# Patient Record
Sex: MALE | Race: WHITE | NOT HISPANIC OR LATINO
[De-identification: names, ages, dates, MRNs, and addresses within clinical notes are randomized per-mention and may not be internally consistent; named-entity substitution may affect disease eponyms.]

---

## 2017-05-16 ENCOUNTER — APPOINTMENT (OUTPATIENT)
Dept: INTERNAL MEDICINE | Facility: CLINIC | Age: 57
End: 2017-05-16

## 2017-05-16 VITALS
SYSTOLIC BLOOD PRESSURE: 116 MMHG | DIASTOLIC BLOOD PRESSURE: 70 MMHG | BODY MASS INDEX: 26.92 KG/M2 | WEIGHT: 188 LBS | HEART RATE: 70 BPM | HEIGHT: 70 IN | RESPIRATION RATE: 16 BRPM

## 2017-05-19 ENCOUNTER — EMERGENCY (EMERGENCY)
Facility: HOSPITAL | Age: 57
LOS: 1 days | Discharge: HOME | End: 2017-05-19
Admitting: EMERGENCY MEDICINE
Payer: COMMERCIAL

## 2017-05-19 PROCEDURE — 99283 EMERGENCY DEPT VISIT LOW MDM: CPT

## 2017-05-19 PROCEDURE — 99282 EMERGENCY DEPT VISIT SF MDM: CPT

## 2017-06-09 ENCOUNTER — MEDICATION RENEWAL (OUTPATIENT)
Age: 57
End: 2017-06-09

## 2017-07-12 ENCOUNTER — MEDICATION RENEWAL (OUTPATIENT)
Age: 57
End: 2017-07-12

## 2017-07-14 ENCOUNTER — MEDICATION RENEWAL (OUTPATIENT)
Age: 57
End: 2017-07-14

## 2017-08-10 ENCOUNTER — MEDICATION RENEWAL (OUTPATIENT)
Age: 57
End: 2017-08-10

## 2017-08-29 LAB
25(OH)D3 SERPL-MCNC: 27.4 NG/ML
ALBUMIN SERPL ELPH-MCNC: 3.5 G/DL
ALP BLD-CCNC: 80 U/L
ALT SERPL-CCNC: 8 U/L
ANION GAP SERPL CALC-SCNC: 13 MMOL/L
APPEARANCE: CLEAR
AST SERPL-CCNC: 9 U/L
BACTERIA: NEGATIVE
BASOPHILS # BLD AUTO: 0.06 K/UL
BASOPHILS NFR BLD AUTO: 0.7 %
BILIRUB SERPL-MCNC: 0.3 MG/DL
BILIRUBIN URINE: NEGATIVE
BLOOD URINE: NEGATIVE
BUN SERPL-MCNC: 21 MG/DL
CALCIUM SERPL-MCNC: 8.8 MG/DL
CHLORIDE SERPL-SCNC: 103 MMOL/L
CHOLEST SERPL-MCNC: 237 MG/DL
CHOLEST/HDLC SERPL: 5.6 RATIO
CO2 SERPL-SCNC: 23 MMOL/L
COLOR: YELLOW
CREAT SERPL-MCNC: 0.82 MG/DL
EOSINOPHIL # BLD AUTO: 0.14 K/UL
EOSINOPHIL NFR BLD AUTO: 1.7 %
GLUCOSE QUALITATIVE U: NORMAL MG/DL
GLUCOSE SERPL-MCNC: 96 MG/DL
HBA1C MFR BLD HPLC: 5.8 %
HCT VFR BLD CALC: 37.5 %
HDLC SERPL-MCNC: 42 MG/DL
HGB BLD-MCNC: 11.5 G/DL
IMM GRANULOCYTES NFR BLD AUTO: 0.2 %
KETONES URINE: NEGATIVE
LDLC SERPL CALC-MCNC: 170 MG/DL
LEUKOCYTE ESTERASE URINE: NEGATIVE
LYMPHOCYTES # BLD AUTO: 2.24 K/UL
LYMPHOCYTES NFR BLD AUTO: 27.5 %
MAN DIFF?: NORMAL
MCHC RBC-ENTMCNC: 24.7 PG
MCHC RBC-ENTMCNC: 30.7 GM/DL
MCV RBC AUTO: 80.5 FL
MICROSCOPIC-UA: NORMAL
MONOCYTES # BLD AUTO: 0.54 K/UL
MONOCYTES NFR BLD AUTO: 6.6 %
NEUTROPHILS # BLD AUTO: 5.16 K/UL
NEUTROPHILS NFR BLD AUTO: 63.3 %
NITRITE URINE: NEGATIVE
PH URINE: 5.5
PLATELET # BLD AUTO: 365 K/UL
POTASSIUM SERPL-SCNC: 3.8 MMOL/L
PROT SERPL-MCNC: 6.8 G/DL
PROTEIN URINE: NEGATIVE MG/DL
PSA SERPL-MCNC: 0.64 NG/ML
RBC # BLD: 4.66 M/UL
RBC # FLD: 14.9 %
RED BLOOD CELLS URINE: 3 /HPF
SODIUM SERPL-SCNC: 139 MMOL/L
SPECIFIC GRAVITY URINE: 1.02
SQUAMOUS EPITHELIAL CELLS: 0 /HPF
TRIGL SERPL-MCNC: 124 MG/DL
TSH SERPL-ACNC: 2.75 UIU/ML
UROBILINOGEN URINE: NORMAL MG/DL
WBC # FLD AUTO: 8.16 K/UL
WHITE BLOOD CELLS URINE: 0 /HPF

## 2017-08-31 ENCOUNTER — APPOINTMENT (OUTPATIENT)
Dept: INTERNAL MEDICINE | Facility: CLINIC | Age: 57
End: 2017-08-31
Payer: COMMERCIAL

## 2017-08-31 PROCEDURE — 99214 OFFICE O/P EST MOD 30 MIN: CPT

## 2017-09-05 ENCOUNTER — MEDICATION RENEWAL (OUTPATIENT)
Age: 57
End: 2017-09-05

## 2017-09-06 ENCOUNTER — MEDICATION RENEWAL (OUTPATIENT)
Age: 57
End: 2017-09-06

## 2017-09-26 ENCOUNTER — APPOINTMENT (OUTPATIENT)
Dept: PSYCHIATRY | Facility: CLINIC | Age: 57
End: 2017-09-26
Payer: COMMERCIAL

## 2017-09-26 PROCEDURE — 99214 OFFICE O/P EST MOD 30 MIN: CPT

## 2017-10-10 ENCOUNTER — MEDICATION RENEWAL (OUTPATIENT)
Age: 57
End: 2017-10-10

## 2017-10-31 ENCOUNTER — APPOINTMENT (OUTPATIENT)
Dept: PSYCHIATRY | Facility: CLINIC | Age: 57
End: 2017-10-31
Payer: COMMERCIAL

## 2017-10-31 PROCEDURE — 99213 OFFICE O/P EST LOW 20 MIN: CPT

## 2017-11-06 ENCOUNTER — MEDICATION RENEWAL (OUTPATIENT)
Age: 57
End: 2017-11-06

## 2017-11-29 ENCOUNTER — FORM ENCOUNTER (OUTPATIENT)
Age: 57
End: 2017-11-29

## 2017-11-30 ENCOUNTER — OUTPATIENT (OUTPATIENT)
Dept: OUTPATIENT SERVICES | Facility: HOSPITAL | Age: 57
LOS: 1 days | End: 2017-11-30
Payer: COMMERCIAL

## 2017-11-30 ENCOUNTER — APPOINTMENT (OUTPATIENT)
Dept: PSYCHIATRY | Facility: CLINIC | Age: 57
End: 2017-11-30
Payer: COMMERCIAL

## 2017-11-30 ENCOUNTER — APPOINTMENT (OUTPATIENT)
Dept: ULTRASOUND IMAGING | Facility: HOSPITAL | Age: 57
End: 2017-11-30
Payer: COMMERCIAL

## 2017-11-30 DIAGNOSIS — K76.0 FATTY (CHANGE OF) LIVER, NOT ELSEWHERE CLASSIFIED: ICD-10-CM

## 2017-11-30 LAB
FERRITIN SERPL-MCNC: 123 NG/ML
IRON SATN MFR SERPL: 18 %
IRON SERPL-MCNC: 47 UG/DL
TIBC SERPL-MCNC: 261 UG/DL
UIBC SERPL-MCNC: 214 UG/DL

## 2017-11-30 PROCEDURE — 76700 US EXAM ABDOM COMPLETE: CPT | Mod: 26

## 2017-11-30 PROCEDURE — 99214 OFFICE O/P EST MOD 30 MIN: CPT

## 2017-11-30 PROCEDURE — 76700 US EXAM ABDOM COMPLETE: CPT

## 2017-12-01 LAB
ENDOMYSIUM IGA SER QL: NEGATIVE
ENDOMYSIUM IGA TITR SER: NORMAL

## 2017-12-04 LAB
GLIADIN IGA SER QL: <5 UNITS
GLIADIN IGG SER QL: <5 UNITS
GLIADIN PEPTIDE IGA SER-ACNC: NEGATIVE
GLIADIN PEPTIDE IGG SER-ACNC: NEGATIVE
TTG IGA SER IA-ACNC: 6 UNITS
TTG IGA SER-ACNC: NEGATIVE
TTG IGG SER IA-ACNC: <5 UNITS
TTG IGG SER IA-ACNC: NEGATIVE

## 2017-12-05 LAB
TESTOST BND SERPL-MCNC: 9.1 PG/ML
TESTOST SERPL-MCNC: 367 NG/DL

## 2017-12-08 LAB
BASOPHILS # BLD AUTO: 0.05 K/UL
BASOPHILS NFR BLD AUTO: 0.7 %
EOSINOPHIL # BLD AUTO: 0.13 K/UL
EOSINOPHIL NFR BLD AUTO: 1.7 %
HCT VFR BLD CALC: 40.3 %
HGB BLD-MCNC: 12.4 G/DL
IMM GRANULOCYTES NFR BLD AUTO: 0.3 %
LYMPHOCYTES # BLD AUTO: 1.79 K/UL
LYMPHOCYTES NFR BLD AUTO: 23.6 %
MAN DIFF?: NORMAL
MCHC RBC-ENTMCNC: 25.3 PG
MCHC RBC-ENTMCNC: 30.8 GM/DL
MCV RBC AUTO: 82.2 FL
MONOCYTES # BLD AUTO: 0.49 K/UL
MONOCYTES NFR BLD AUTO: 6.5 %
NEUTROPHILS # BLD AUTO: 5.1 K/UL
NEUTROPHILS NFR BLD AUTO: 67.2 %
PLATELET # BLD AUTO: 325 K/UL
RBC # BLD: 4.9 M/UL
RBC # FLD: 15.3 %
WBC # FLD AUTO: 7.58 K/UL

## 2017-12-15 ENCOUNTER — APPOINTMENT (OUTPATIENT)
Dept: PSYCHIATRY | Facility: CLINIC | Age: 57
End: 2017-12-15

## 2017-12-15 ENCOUNTER — APPOINTMENT (OUTPATIENT)
Dept: PSYCHIATRY | Facility: CLINIC | Age: 57
End: 2017-12-15
Payer: COMMERCIAL

## 2017-12-15 PROCEDURE — 99214 OFFICE O/P EST MOD 30 MIN: CPT

## 2018-01-12 ENCOUNTER — RX RENEWAL (OUTPATIENT)
Age: 58
End: 2018-01-12

## 2018-02-01 ENCOUNTER — APPOINTMENT (OUTPATIENT)
Dept: PSYCHIATRY | Facility: CLINIC | Age: 58
End: 2018-02-01
Payer: COMMERCIAL

## 2018-02-01 PROCEDURE — 99214 OFFICE O/P EST MOD 30 MIN: CPT

## 2018-04-10 ENCOUNTER — APPOINTMENT (OUTPATIENT)
Dept: PSYCHIATRY | Facility: CLINIC | Age: 58
End: 2018-04-10
Payer: COMMERCIAL

## 2018-04-10 PROCEDURE — 99214 OFFICE O/P EST MOD 30 MIN: CPT

## 2018-06-21 ENCOUNTER — APPOINTMENT (OUTPATIENT)
Dept: INTERNAL MEDICINE | Facility: CLINIC | Age: 58
End: 2018-06-21
Payer: COMMERCIAL

## 2018-06-21 VITALS
HEIGHT: 70 IN | RESPIRATION RATE: 16 BRPM | WEIGHT: 188 LBS | DIASTOLIC BLOOD PRESSURE: 70 MMHG | BODY MASS INDEX: 26.92 KG/M2 | SYSTOLIC BLOOD PRESSURE: 120 MMHG | HEART RATE: 70 BPM

## 2018-06-21 DIAGNOSIS — Z23 ENCOUNTER FOR IMMUNIZATION: ICD-10-CM

## 2018-06-21 PROCEDURE — 99396 PREV VISIT EST AGE 40-64: CPT

## 2018-06-21 NOTE — HEALTH RISK ASSESSMENT
[Excellent] : ~his/her~  mood as  excellent [No falls in past year] : Patient reported no falls in the past year [0] : 2) Feeling down, depressed, or hopeless: Not at all (0) [FreeTextEntry1] : Mood is doing great on effexor tapered and adderall reduced  but had issues with the Rx [] : No [TNO4Balze] : 0

## 2018-07-02 ENCOUNTER — APPOINTMENT (OUTPATIENT)
Dept: PSYCHIATRY | Facility: CLINIC | Age: 58
End: 2018-07-02

## 2018-07-02 ENCOUNTER — MEDICATION RENEWAL (OUTPATIENT)
Age: 58
End: 2018-07-02

## 2018-07-05 RX ORDER — DEXTROAMPHETAMINE SACCHARATE, AMPHETAMINE ASPARTATE MONOHYDRATE, DEXTROAMPHETAMINE SULFATE AND AMPHETAMINE SULFATE 2.5; 2.5; 2.5; 2.5 MG/1; MG/1; MG/1; MG/1
10 CAPSULE, EXTENDED RELEASE ORAL TWICE DAILY
Qty: 60 | Refills: 0 | Status: DISCONTINUED | OUTPATIENT
Start: 2018-06-21 | End: 2018-07-05

## 2018-08-01 ENCOUNTER — RX RENEWAL (OUTPATIENT)
Age: 58
End: 2018-08-01

## 2018-08-14 ENCOUNTER — NON-APPOINTMENT (OUTPATIENT)
Age: 58
End: 2018-08-14

## 2018-08-14 ENCOUNTER — APPOINTMENT (OUTPATIENT)
Dept: CARDIOLOGY | Facility: CLINIC | Age: 58
End: 2018-08-14
Payer: COMMERCIAL

## 2018-08-14 VITALS
RESPIRATION RATE: 15 BRPM | BODY MASS INDEX: 27.06 KG/M2 | SYSTOLIC BLOOD PRESSURE: 107 MMHG | OXYGEN SATURATION: 95 % | HEIGHT: 70 IN | DIASTOLIC BLOOD PRESSURE: 73 MMHG | WEIGHT: 189 LBS | HEART RATE: 65 BPM

## 2018-08-14 VITALS — SYSTOLIC BLOOD PRESSURE: 106 MMHG | DIASTOLIC BLOOD PRESSURE: 80 MMHG | HEART RATE: 72 BPM

## 2018-08-14 PROCEDURE — 93000 ELECTROCARDIOGRAM COMPLETE: CPT

## 2018-08-14 PROCEDURE — 93306 TTE W/DOPPLER COMPLETE: CPT

## 2018-08-14 PROCEDURE — 99204 OFFICE O/P NEW MOD 45 MIN: CPT

## 2018-08-27 ENCOUNTER — RX RENEWAL (OUTPATIENT)
Age: 58
End: 2018-08-27

## 2018-09-13 ENCOUNTER — APPOINTMENT (OUTPATIENT)
Dept: INTERNAL MEDICINE | Facility: CLINIC | Age: 58
End: 2018-09-13
Payer: COMMERCIAL

## 2018-09-13 VITALS
RESPIRATION RATE: 16 BRPM | SYSTOLIC BLOOD PRESSURE: 114 MMHG | WEIGHT: 178 LBS | OXYGEN SATURATION: 97 % | HEIGHT: 70 IN | BODY MASS INDEX: 25.48 KG/M2 | TEMPERATURE: 98.4 F | HEART RATE: 70 BPM | DIASTOLIC BLOOD PRESSURE: 70 MMHG

## 2018-09-13 DIAGNOSIS — Z82.5 FAMILY HISTORY OF ASTHMA AND OTHER CHRONIC LOWER RESPIRATORY DISEASES: ICD-10-CM

## 2018-09-13 DIAGNOSIS — Z23 ENCOUNTER FOR IMMUNIZATION: ICD-10-CM

## 2018-09-13 LAB
ALBUMIN SERPL ELPH-MCNC: 3.9 G/DL
ALP BLD-CCNC: 74 U/L
ALT SERPL-CCNC: 10 U/L
ANION GAP SERPL CALC-SCNC: 12 MMOL/L
APPEARANCE: CLEAR
AST SERPL-CCNC: 15 U/L
BACTERIA: NEGATIVE
BASOPHILS # BLD AUTO: 0.04 K/UL
BASOPHILS NFR BLD AUTO: 0.6 %
BILIRUB SERPL-MCNC: 0.2 MG/DL
BILIRUBIN URINE: NEGATIVE
BLOOD URINE: NEGATIVE
BUN SERPL-MCNC: 20 MG/DL
CALCIUM SERPL-MCNC: 9.3 MG/DL
CHLORIDE SERPL-SCNC: 104 MMOL/L
CHOLEST SERPL-MCNC: 178 MG/DL
CHOLEST/HDLC SERPL: 5.1 RATIO
CO2 SERPL-SCNC: 24 MMOL/L
COLOR: YELLOW
CREAT SERPL-MCNC: 0.95 MG/DL
EOSINOPHIL # BLD AUTO: 0.14 K/UL
EOSINOPHIL NFR BLD AUTO: 2.2 %
GLUCOSE QUALITATIVE U: NEGATIVE MG/DL
GLUCOSE SERPL-MCNC: 99 MG/DL
HBA1C MFR BLD HPLC: 5.7 %
HCT VFR BLD CALC: 40.9 %
HDLC SERPL-MCNC: 35 MG/DL
HGB BLD-MCNC: 12.4 G/DL
IMM GRANULOCYTES NFR BLD AUTO: 0.2 %
KETONES URINE: NEGATIVE
LDLC SERPL CALC-MCNC: 120 MG/DL
LEUKOCYTE ESTERASE URINE: NEGATIVE
LYMPHOCYTES # BLD AUTO: 1.64 K/UL
LYMPHOCYTES NFR BLD AUTO: 25.5 %
MAN DIFF?: NORMAL
MCHC RBC-ENTMCNC: 24.7 PG
MCHC RBC-ENTMCNC: 30.3 GM/DL
MCV RBC AUTO: 81.5 FL
MICROSCOPIC-UA: NORMAL
MONOCYTES # BLD AUTO: 0.44 K/UL
MONOCYTES NFR BLD AUTO: 6.9 %
NEUTROPHILS # BLD AUTO: 4.15 K/UL
NEUTROPHILS NFR BLD AUTO: 64.6 %
NITRITE URINE: NEGATIVE
PH URINE: 5.5
PLATELET # BLD AUTO: 335 K/UL
POTASSIUM SERPL-SCNC: 4.5 MMOL/L
PROT SERPL-MCNC: 6.7 G/DL
PROTEIN URINE: NEGATIVE MG/DL
RBC # BLD: 5.02 M/UL
RBC # FLD: 15.5 %
RED BLOOD CELLS URINE: 2 /HPF
SODIUM SERPL-SCNC: 140 MMOL/L
SPECIFIC GRAVITY URINE: 1.02
SQUAMOUS EPITHELIAL CELLS: 1 /HPF
TRIGL SERPL-MCNC: 117 MG/DL
UROBILINOGEN URINE: NEGATIVE MG/DL
WBC # FLD AUTO: 6.42 K/UL
WHITE BLOOD CELLS URINE: 1 /HPF

## 2018-09-13 PROCEDURE — 90686 IIV4 VACC NO PRSV 0.5 ML IM: CPT

## 2018-09-13 PROCEDURE — G0008: CPT

## 2018-09-13 PROCEDURE — 99214 OFFICE O/P EST MOD 30 MIN: CPT | Mod: 25

## 2018-09-13 RX ORDER — VENLAFAXINE HYDROCHLORIDE 37.5 MG/1
37.5 CAPSULE, EXTENDED RELEASE ORAL
Qty: 90 | Refills: 0 | Status: DISCONTINUED | COMMUNITY
Start: 2018-03-08 | End: 2018-09-13

## 2018-09-13 RX ORDER — OSELTAMIVIR PHOSPHATE 75 MG/1
75 CAPSULE ORAL TWICE DAILY
Qty: 10 | Refills: 0 | Status: DISCONTINUED | COMMUNITY
Start: 2018-03-07 | End: 2018-09-13

## 2018-09-13 NOTE — HISTORY OF PRESENT ILLNESS
[FreeTextEntry1] : Follow up  [de-identified] : Dieting lost weight eliminated gluten and carbs, decreased dairy\par \par Had echo normal EF\par

## 2018-09-14 LAB
25(OH)D3 SERPL-MCNC: 39.3 NG/ML
PSA SERPL-MCNC: 0.67 NG/ML
TSH SERPL-ACNC: 2.05 UIU/ML

## 2018-09-18 ENCOUNTER — RX CHANGE (OUTPATIENT)
Age: 58
End: 2018-09-18

## 2018-09-24 ENCOUNTER — RX RENEWAL (OUTPATIENT)
Age: 58
End: 2018-09-24

## 2018-09-25 RX ORDER — DEXTROAMPHETAMINE SACCHARATE, AMPHETAMINE ASPARTATE MONOHYDRATE, DEXTROAMPHETAMINE SULFATE AND AMPHETAMINE SULFATE 7.5; 7.5; 7.5; 7.5 MG/1; MG/1; MG/1; MG/1
30 CAPSULE, EXTENDED RELEASE ORAL TWICE DAILY
Qty: 60 | Refills: 0 | Status: DISCONTINUED | COMMUNITY
Start: 2017-05-16 | End: 2018-09-13

## 2018-10-01 ENCOUNTER — APPOINTMENT (OUTPATIENT)
Dept: INTERNAL MEDICINE | Facility: CLINIC | Age: 58
End: 2018-10-01
Payer: COMMERCIAL

## 2018-10-01 VITALS
HEART RATE: 90 BPM | SYSTOLIC BLOOD PRESSURE: 127 MMHG | BODY MASS INDEX: 25.91 KG/M2 | HEIGHT: 70 IN | OXYGEN SATURATION: 97 % | DIASTOLIC BLOOD PRESSURE: 78 MMHG | WEIGHT: 181 LBS

## 2018-10-01 PROCEDURE — 99214 OFFICE O/P EST MOD 30 MIN: CPT

## 2018-10-01 RX ORDER — DEXTROAMPHETAMINE SACCHARATE, AMPHETAMINE ASPARTATE MONOHYDRATE, DEXTROAMPHETAMINE SULFATE AND AMPHETAMINE SULFATE 2.5; 2.5; 2.5; 2.5 MG/1; MG/1; MG/1; MG/1
10 CAPSULE, EXTENDED RELEASE ORAL TWICE DAILY
Qty: 60 | Refills: 0 | Status: DISCONTINUED | COMMUNITY
Start: 2018-09-18 | End: 2018-10-01

## 2018-10-01 RX ORDER — DEXTROAMPHETAMINE SACCHARATE, AMPHETAMINE ASPARTATE MONOHYDRATE, DEXTROAMPHETAMINE SULFATE AND AMPHETAMINE SULFATE 5; 5; 5; 5 MG/1; MG/1; MG/1; MG/1
20 CAPSULE, EXTENDED RELEASE ORAL TWICE DAILY
Qty: 60 | Refills: 0 | Status: DISCONTINUED | COMMUNITY
Start: 2018-09-13 | End: 2018-10-01

## 2018-10-01 NOTE — HISTORY OF PRESENT ILLNESS
[FreeTextEntry1] : adult ADHD folllow up [de-identified] : Medication renewal for ADHD, got "rid of" his 10 and 20 mg as son in house has drug program

## 2018-10-18 ENCOUNTER — RX RENEWAL (OUTPATIENT)
Age: 58
End: 2018-10-18

## 2018-10-22 ENCOUNTER — RX RENEWAL (OUTPATIENT)
Age: 58
End: 2018-10-22

## 2018-11-02 ENCOUNTER — MEDICATION RENEWAL (OUTPATIENT)
Age: 58
End: 2018-11-02

## 2018-11-02 ENCOUNTER — RX RENEWAL (OUTPATIENT)
Age: 58
End: 2018-11-02

## 2019-01-07 ENCOUNTER — RX RENEWAL (OUTPATIENT)
Age: 59
End: 2019-01-07

## 2019-01-28 ENCOUNTER — MEDICATION RENEWAL (OUTPATIENT)
Age: 59
End: 2019-01-28

## 2019-01-31 ENCOUNTER — APPOINTMENT (OUTPATIENT)
Dept: INTERNAL MEDICINE | Facility: CLINIC | Age: 59
End: 2019-01-31
Payer: COMMERCIAL

## 2019-01-31 VITALS
OXYGEN SATURATION: 98 % | HEART RATE: 88 BPM | WEIGHT: 177 LBS | SYSTOLIC BLOOD PRESSURE: 124 MMHG | DIASTOLIC BLOOD PRESSURE: 72 MMHG | HEIGHT: 70 IN | RESPIRATION RATE: 16 BRPM | TEMPERATURE: 98.1 F | BODY MASS INDEX: 25.34 KG/M2

## 2019-01-31 PROCEDURE — 99214 OFFICE O/P EST MOD 30 MIN: CPT

## 2019-02-19 ENCOUNTER — RX RENEWAL (OUTPATIENT)
Age: 59
End: 2019-02-19

## 2019-03-19 ENCOUNTER — MEDICATION RENEWAL (OUTPATIENT)
Age: 59
End: 2019-03-19

## 2019-04-11 ENCOUNTER — RX RENEWAL (OUTPATIENT)
Age: 59
End: 2019-04-11

## 2019-05-02 ENCOUNTER — APPOINTMENT (OUTPATIENT)
Dept: INTERNAL MEDICINE | Facility: CLINIC | Age: 59
End: 2019-05-02
Payer: COMMERCIAL

## 2019-05-02 VITALS
HEART RATE: 70 BPM | SYSTOLIC BLOOD PRESSURE: 110 MMHG | WEIGHT: 177 LBS | HEIGHT: 70 IN | DIASTOLIC BLOOD PRESSURE: 70 MMHG | BODY MASS INDEX: 25.34 KG/M2 | RESPIRATION RATE: 16 BRPM

## 2019-05-02 PROCEDURE — 99214 OFFICE O/P EST MOD 30 MIN: CPT

## 2019-05-02 NOTE — HISTORY OF PRESENT ILLNESS
[FreeTextEntry1] : CC:" I am here for refill of my Adderall"\par  [de-identified] : HPI: patient with ADHD, anemia, and osteoarthritis presents for refill Adderall and patient is having has right hip resurfacing surgery of the right hip in [facility]. patient denies any pain, any chest pain, palpitations, or mood changes.\par Patient denies any trading medications or overusing his Adderall. patient verbalize agreement to random urine test for medication use if warranted. \par \par Concerned about the chromium and cobalt levels which are high

## 2019-05-02 NOTE — PHYSICAL EXAM
[No Acute Distress] : no acute distress [Well Developed] : well developed [Well Nourished] : well nourished [Well-Appearing] : well-appearing [PERRL] : pupils equal round and reactive to light [Normal Sclera/Conjunctiva] : normal sclera/conjunctiva [Normal Oropharynx] : the oropharynx was normal [EOMI] : extraocular movements intact [Normal Outer Ear/Nose] : the outer ears and nose were normal in appearance [No Lymphadenopathy] : no lymphadenopathy [No JVD] : no jugular venous distention [Supple] : supple [Thyroid Normal, No Nodules] : the thyroid was normal and there were no nodules present [No Respiratory Distress] : no respiratory distress  [No Accessory Muscle Use] : no accessory muscle use [Clear to Auscultation] : lungs were clear to auscultation bilaterally [Normal Rate] : normal rate  [Normal S1, S2] : normal S1 and S2 [Regular Rhythm] : with a regular rhythm [No Carotid Bruits] : no carotid bruits [No Murmur] : no murmur heard [No Abdominal Bruit] : a ~M bruit was not heard ~T in the abdomen [No Varicosities] : no varicosities [No Extremity Clubbing/Cyanosis] : no extremity clubbing/cyanosis [No Edema] : there was no peripheral edema [Pedal Pulses Present] : the pedal pulses are present [No Palpable Aorta] : no palpable aorta [Soft] : abdomen soft [Non Tender] : non-tender [Non-distended] : non-distended [No Masses] : no abdominal mass palpated [No HSM] : no HSM [Normal Bowel Sounds] : normal bowel sounds [Normal Posterior Cervical Nodes] : no posterior cervical lymphadenopathy [Normal Anterior Cervical Nodes] : no anterior cervical lymphadenopathy [No CVA Tenderness] : no CVA  tenderness [Grossly Normal Strength/Tone] : grossly normal strength/tone [No Joint Swelling] : no joint swelling [No Spinal Tenderness] : no spinal tenderness [Normal Gait] : normal gait [No Rash] : no rash [Coordination Grossly Intact] : coordination grossly intact [Deep Tendon Reflexes (DTR)] : deep tendon reflexes were 2+ and symmetric [No Focal Deficits] : no focal deficits [Normal Insight/Judgement] : insight and judgment were intact [Normal Affect] : the affect was normal

## 2019-05-21 ENCOUNTER — APPOINTMENT (OUTPATIENT)
Dept: INTERNAL MEDICINE | Facility: CLINIC | Age: 59
End: 2019-05-21
Payer: COMMERCIAL

## 2019-05-21 VITALS
HEIGHT: 70 IN | WEIGHT: 177 LBS | HEART RATE: 70 BPM | DIASTOLIC BLOOD PRESSURE: 70 MMHG | BODY MASS INDEX: 25.34 KG/M2 | SYSTOLIC BLOOD PRESSURE: 110 MMHG

## 2019-05-21 DIAGNOSIS — Z87.09 PERSONAL HISTORY OF OTHER DISEASES OF THE RESPIRATORY SYSTEM: ICD-10-CM

## 2019-05-21 DIAGNOSIS — M16.10 UNILATERAL PRIMARY OSTEOARTHRITIS, UNSPECIFIED HIP: ICD-10-CM

## 2019-05-21 PROCEDURE — 99214 OFFICE O/P EST MOD 30 MIN: CPT

## 2019-05-21 NOTE — HISTORY OF PRESENT ILLNESS
[FreeTextEntry8] : "FLU"\par -symptoms knocked him on his back\par -myalgias, arthragias, HA, cough\par -low grade fevers\par -recent surgery on tylenol\par \par right hip resurfacing Vini Rayne went well \par

## 2019-05-29 ENCOUNTER — RX RENEWAL (OUTPATIENT)
Age: 59
End: 2019-05-29

## 2019-06-09 ENCOUNTER — EMERGENCY (EMERGENCY)
Facility: HOSPITAL | Age: 59
LOS: 1 days | Discharge: HOME | End: 2019-06-09
Attending: EMERGENCY MEDICINE | Admitting: EMERGENCY MEDICINE
Payer: COMMERCIAL

## 2019-06-09 VITALS
HEART RATE: 74 BPM | DIASTOLIC BLOOD PRESSURE: 71 MMHG | TEMPERATURE: 98 F | OXYGEN SATURATION: 97 % | SYSTOLIC BLOOD PRESSURE: 105 MMHG | RESPIRATION RATE: 14 BRPM

## 2019-06-09 VITALS — HEIGHT: 70 IN | WEIGHT: 177.03 LBS

## 2019-06-09 DIAGNOSIS — Z96.649 PRESENCE OF UNSPECIFIED ARTIFICIAL HIP JOINT: Chronic | ICD-10-CM

## 2019-06-09 LAB
ALBUMIN SERPL ELPH-MCNC: 3 G/DL — LOW (ref 3.3–5)
ALP SERPL-CCNC: 101 U/L — SIGNIFICANT CHANGE UP (ref 40–120)
ALT FLD-CCNC: 17 U/L DA — SIGNIFICANT CHANGE UP (ref 10–45)
ANION GAP SERPL CALC-SCNC: 9 MMOL/L — SIGNIFICANT CHANGE UP (ref 5–17)
AST SERPL-CCNC: 14 U/L — SIGNIFICANT CHANGE UP (ref 10–40)
BILIRUB SERPL-MCNC: 0.2 MG/DL — SIGNIFICANT CHANGE UP (ref 0.2–1.2)
BUN SERPL-MCNC: 14 MG/DL — SIGNIFICANT CHANGE UP (ref 7–23)
CALCIUM SERPL-MCNC: 9.1 MG/DL — SIGNIFICANT CHANGE UP (ref 8.4–10.5)
CHLORIDE SERPL-SCNC: 103 MMOL/L — SIGNIFICANT CHANGE UP (ref 96–108)
CO2 SERPL-SCNC: 28 MMOL/L — SIGNIFICANT CHANGE UP (ref 22–31)
CREAT SERPL-MCNC: 0.87 MG/DL — SIGNIFICANT CHANGE UP (ref 0.5–1.3)
D DIMER BLD IA.RAPID-MCNC: 830 NG/ML DDU — HIGH
GLUCOSE SERPL-MCNC: 121 MG/DL — HIGH (ref 70–99)
HCT VFR BLD CALC: 36.9 % — LOW (ref 39–50)
HGB BLD-MCNC: 11.3 G/DL — LOW (ref 13–17)
LACTATE SERPL-SCNC: 1.3 MMOL/L — SIGNIFICANT CHANGE UP (ref 0.7–2)
MCHC RBC-ENTMCNC: 25.2 PG — LOW (ref 27–34)
MCHC RBC-ENTMCNC: 30.6 GM/DL — LOW (ref 32–36)
MCV RBC AUTO: 82.4 FL — SIGNIFICANT CHANGE UP (ref 80–100)
NRBC # BLD: 0 /100 WBCS — SIGNIFICANT CHANGE UP (ref 0–0)
PLATELET # BLD AUTO: 567 K/UL — HIGH (ref 150–400)
POTASSIUM SERPL-MCNC: 4.5 MMOL/L — SIGNIFICANT CHANGE UP (ref 3.5–5.3)
POTASSIUM SERPL-SCNC: 4.5 MMOL/L — SIGNIFICANT CHANGE UP (ref 3.5–5.3)
PROT SERPL-MCNC: 7.3 G/DL — SIGNIFICANT CHANGE UP (ref 6–8.3)
RBC # BLD: 4.48 M/UL — SIGNIFICANT CHANGE UP (ref 4.2–5.8)
RBC # FLD: 14.9 % — HIGH (ref 10.3–14.5)
SODIUM SERPL-SCNC: 140 MMOL/L — SIGNIFICANT CHANGE UP (ref 135–145)
WBC # BLD: 7.75 K/UL — SIGNIFICANT CHANGE UP (ref 3.8–10.5)
WBC # FLD AUTO: 7.75 K/UL — SIGNIFICANT CHANGE UP (ref 3.8–10.5)

## 2019-06-09 PROCEDURE — 96361 HYDRATE IV INFUSION ADD-ON: CPT

## 2019-06-09 PROCEDURE — 70450 CT HEAD/BRAIN W/O DYE: CPT

## 2019-06-09 PROCEDURE — 99284 EMERGENCY DEPT VISIT MOD MDM: CPT | Mod: 25

## 2019-06-09 PROCEDURE — 71275 CT ANGIOGRAPHY CHEST: CPT | Mod: 26

## 2019-06-09 PROCEDURE — 93005 ELECTROCARDIOGRAM TRACING: CPT

## 2019-06-09 PROCEDURE — 85379 FIBRIN DEGRADATION QUANT: CPT

## 2019-06-09 PROCEDURE — 99284 EMERGENCY DEPT VISIT MOD MDM: CPT

## 2019-06-09 PROCEDURE — 85027 COMPLETE CBC AUTOMATED: CPT

## 2019-06-09 PROCEDURE — 96360 HYDRATION IV INFUSION INIT: CPT

## 2019-06-09 PROCEDURE — 71275 CT ANGIOGRAPHY CHEST: CPT

## 2019-06-09 PROCEDURE — 36415 COLL VENOUS BLD VENIPUNCTURE: CPT

## 2019-06-09 PROCEDURE — 80053 COMPREHEN METABOLIC PANEL: CPT

## 2019-06-09 PROCEDURE — 70450 CT HEAD/BRAIN W/O DYE: CPT | Mod: 26

## 2019-06-09 PROCEDURE — 93010 ELECTROCARDIOGRAM REPORT: CPT

## 2019-06-09 PROCEDURE — 83605 ASSAY OF LACTIC ACID: CPT

## 2019-06-09 RX ORDER — TADALAFIL 10 MG/1
1 TABLET, FILM COATED ORAL
Qty: 0 | Refills: 0 | DISCHARGE

## 2019-06-09 RX ORDER — VENLAFAXINE HCL 75 MG
75 CAPSULE, EXT RELEASE 24 HR ORAL
Qty: 0 | Refills: 0 | DISCHARGE

## 2019-06-09 RX ORDER — TAMSULOSIN HYDROCHLORIDE 0.4 MG/1
1 CAPSULE ORAL
Qty: 0 | Refills: 0 | DISCHARGE

## 2019-06-09 RX ORDER — SODIUM CHLORIDE 9 MG/ML
1000 INJECTION INTRAMUSCULAR; INTRAVENOUS; SUBCUTANEOUS ONCE
Refills: 0 | Status: COMPLETED | OUTPATIENT
Start: 2019-06-09 | End: 2019-06-09

## 2019-06-09 RX ADMIN — SODIUM CHLORIDE 1000 MILLILITER(S): 9 INJECTION INTRAMUSCULAR; INTRAVENOUS; SUBCUTANEOUS at 14:30

## 2019-06-09 RX ADMIN — SODIUM CHLORIDE 1000 MILLILITER(S): 9 INJECTION INTRAMUSCULAR; INTRAVENOUS; SUBCUTANEOUS at 16:39

## 2019-06-09 NOTE — ED PROVIDER NOTE - ATTENDING CONTRIBUTION TO CARE
Jerrell with [facility] Velazquez. 59 yo M pmHx depression on effexor & s/p hip resurfacing 5 weeks ago here today for syncopal episode today. Patient states he had woken up from sleep around 1pm today and got up from bed and was walking to a neighboring bedroom when he 'fainted'. To me, he denies prodrome. No warning prior to episode. Patient states he recalls waking up slumped against the wall in his home. He denies any incontinence of urine or tongue biting. Patient complains of small scratch and lump to back of head. He states the episode lasted only a few seconds but he cannot recall exact length of time. The event was unwitnessed. Patient states he had a similar episode 1 year ago and but did not seek medical attention. Patient notes increased fatigue and lethargy lately. Here, he offers no complaints. He denies any fever, chills, nausea, vomiting, diarrhea, headache, dizziness, chest pain or SOB. Exam with abrasion to the top of head. No evidence of tongue biting. Lungs clear. Heart sounds nml. CT brain. DDimer, trop, EKG. Orthostatics performed are normal. I performed a face to face bedside interview with patient regarding history of present illness, review of symptoms and past medical history. I completed an independent physical exam.  I have discussed the patient's plan of care with Physician Assistant (PA). I agree with note as stated above, having amended the EMR as needed to reflect my findings.   This includes History of Present Illness, HIV, Past Medical/Surgical/Family/Social History, Allergies and Home Medications, Review of Systems, Physical Exam, and any Progress Notes during the time I functioned as the attending physician for this patient.

## 2019-06-09 NOTE — ED PROVIDER NOTE - PROVIDER TOKENS
PROVIDER:[TOKEN:[[id number]]],PROVIDER:[TOKEN:[[id number]]] PROVIDER:[TOKEN:[[id number]]],PROVIDER:[TOKEN:[[id number]]],PROVIDER:[TOKEN:[[id number]]]

## 2019-06-09 NOTE — ED ADULT NURSE NOTE - NSIMPLEMENTINTERV_GEN_ALL_ED
Implemented All Fall with Harm Risk Interventions:  [place] to call system. Call bell, personal items and telephone within reach. Instruct patient to call for assistance. Room bathroom lighting operational. Non-slip footwear when patient is off stretcher. Physically safe environment: no spills, clutter or unnecessary equipment. Stretcher in lowest position, wheels locked, appropriate side rails in place. Provide visual cue, wrist band, yellow gown, etc. Monitor gait and stability. Monitor for mental status changes and reorient to person, place, and time. Review medications for side effects contributing to fall risk. Reinforce activity limits and safety measures with patient and family. Provide visual clues: red socks.

## 2019-06-09 NOTE — ED PROVIDER NOTE - CARE PROVIDERS DIRECT ADDRESSES
[email or web address]

## 2019-06-09 NOTE — ED PROVIDER NOTE - CLINICAL SUMMARY MEDICAL DECISION MAKING FREE TEXT BOX
59 yo M pmHx as noted here for syncopal episode today. VSS. NAD, well appearing. No focal neuro deficits. Ambulatory with steady gait. A&Ox3. CN2-12 intact. CT head unremarkable. EKG w/ RBBB, no old ekg for comparison. D-dimer elevated, CT angio chest negative. Patient seen at bedside after exams, states he feels well. Case discussed with Dr. Russell, patient is okay for d/c to f/u PCP, neuro, and cardio.

## 2019-06-09 NOTE — ED PROVIDER NOTE - CARE PROVIDER_API CALL
Allan Enriquez)  Cardiology; Internal Medicine  [address], [address]  [place] [address]  Phone: [phone]  Fax: [phone]  Follow Up Time:     Mingo Coburn)  Neurology  [address], [address]  [place], [place] [id number]  Phone: [phone]  Fax: [phone]  Follow Up Time: Allan Enriquez)  Cardiology; Internal Medicine  [address], [address]  [place] [address]  Phone: [phone]  Fax: [phone]  Follow Up Time:     Mingo Coburn)  Neurology  [address], [address]  [place], [place] [id number]  Phone: [phone]  Fax: [phone]  Follow Up Time:     Reyes, John A (MD)  Internal Medicine  [address], [address]  [place] [address]  Phone: [phone]  Fax: [phone]  Follow Up Time:

## 2019-06-09 NOTE — ED PROVIDER NOTE - NSFOLLOWUPINSTRUCTIONS_ED_ALL_ED_FT
You must follow up with to care providers listed below for outpatient evaluation. If your symptoms return you should go to the ED immediately.

## 2019-06-09 NOTE — ED PROVIDER NOTE - OBJECTIVE STATEMENT
59 yo M pmHx depression on effexor, s/p hip resurfacing 5 weeks ago here today for syncopal episode today. Patient states he had woken up from sleep around 1pm and was walking to a neighboring bedroom when he felt acutely dizzy and fainted. Patient states he recalls waking up slumped against the wall in his home. He states the episode lasted only a few seconds but he cannot recall exactly. The event was unwitnessed. Patient states he had a similar episode 1 year ago and but did not seek medical attention. 59 yo M pmHx depression on effexor & s/p hip resurfacing 5 weeks ago here today for syncopal episode today. Patient states he had woken up from sleep around 1pm today and got up from bed and was walking to a neighboring bedroom when he felt acutely dizzy and 'fainted'. Patient states he recalls waking up slumped against the wall in his home. Patient complains of small scratch and lump to back of head. He states the episode lasted only a few seconds but he cannot recall exact length of time. The event was unwitnessed. Patient states he had a similar episode 1 year ago and but did not seek medical attention. Patient notes increased fatigue and lethargy lately - he believes is associated with missed doses of effexor this week. He denies any fever, chills, nausea, vomiting, diarrhea, headache, dizziness, chest pain or SOB. 59 yo M pmHx depression on effexor & s/p hip resurfacing 5 weeks ago here today for syncopal episode today. Patient states he had woken up from sleep around 1pm today and got up from bed and was walking to a neighboring bedroom when he felt acutely dizzy and 'fainted'. Patient states he recalls waking up slumped against the wall in his home. He denies any incontinence of urine or tongue bitting. Patient complains of small scratch and lump to back of head. He states the episode lasted only a few seconds but he cannot recall exact length of time. The event was unwitnessed. Patient states he had a similar episode 1 year ago and but did not seek medical attention. Patient notes increased fatigue and lethargy lately - he believes is associated with missed doses of effexor this week. He denies any fever, chills, nausea, vomiting, diarrhea, headache, dizziness, chest pain or SOB.

## 2019-06-11 PROBLEM — N40.0 BENIGN PROSTATIC HYPERPLASIA WITHOUT LOWER URINARY TRACT SYMPTOMS: Chronic | Status: ACTIVE | Noted: 2019-06-09

## 2019-06-11 PROBLEM — F41.1 GENERALIZED ANXIETY DISORDER: Chronic | Status: ACTIVE | Noted: 2019-06-09

## 2019-06-20 ENCOUNTER — NON-APPOINTMENT (OUTPATIENT)
Age: 59
End: 2019-06-20

## 2019-06-20 ENCOUNTER — APPOINTMENT (OUTPATIENT)
Dept: INTERNAL MEDICINE | Facility: CLINIC | Age: 59
End: 2019-06-20
Payer: COMMERCIAL

## 2019-06-20 ENCOUNTER — APPOINTMENT (OUTPATIENT)
Dept: CARDIOLOGY | Facility: CLINIC | Age: 59
End: 2019-06-20
Payer: COMMERCIAL

## 2019-06-20 VITALS
HEART RATE: 74 BPM | DIASTOLIC BLOOD PRESSURE: 79 MMHG | RESPIRATION RATE: 15 BRPM | HEIGHT: 70 IN | WEIGHT: 180 LBS | SYSTOLIC BLOOD PRESSURE: 122 MMHG | OXYGEN SATURATION: 98 % | TEMPERATURE: 98.1 F | BODY MASS INDEX: 25.77 KG/M2

## 2019-06-20 VITALS — SYSTOLIC BLOOD PRESSURE: 110 MMHG | DIASTOLIC BLOOD PRESSURE: 70 MMHG

## 2019-06-20 VITALS
HEIGHT: 70 IN | DIASTOLIC BLOOD PRESSURE: 78 MMHG | RESPIRATION RATE: 15 BRPM | HEART RATE: 66 BPM | WEIGHT: 180 LBS | BODY MASS INDEX: 25.77 KG/M2 | SYSTOLIC BLOOD PRESSURE: 117 MMHG | OXYGEN SATURATION: 97 %

## 2019-06-20 VITALS — SYSTOLIC BLOOD PRESSURE: 110 MMHG | DIASTOLIC BLOOD PRESSURE: 60 MMHG

## 2019-06-20 VITALS — HEART RATE: 72 BPM | SYSTOLIC BLOOD PRESSURE: 110 MMHG | DIASTOLIC BLOOD PRESSURE: 80 MMHG

## 2019-06-20 VITALS — SYSTOLIC BLOOD PRESSURE: 120 MMHG | DIASTOLIC BLOOD PRESSURE: 80 MMHG

## 2019-06-20 PROCEDURE — 99214 OFFICE O/P EST MOD 30 MIN: CPT

## 2019-06-20 PROCEDURE — 93306 TTE W/DOPPLER COMPLETE: CPT

## 2019-06-20 PROCEDURE — 93000 ELECTROCARDIOGRAM COMPLETE: CPT

## 2019-06-20 PROCEDURE — 99215 OFFICE O/P EST HI 40 MIN: CPT

## 2019-06-20 RX ORDER — DEXTROAMPHETAMINE SACCHARATE, AMPHETAMINE ASPARTATE MONOHYDRATE, DEXTROAMPHETAMINE SULFATE AND AMPHETAMINE SULFATE 7.5; 7.5; 7.5; 7.5 MG/1; MG/1; MG/1; MG/1
30 CAPSULE, EXTENDED RELEASE ORAL TWICE DAILY
Qty: 60 | Refills: 0 | Status: DISCONTINUED | COMMUNITY
Start: 2018-10-01 | End: 2019-06-20

## 2019-06-20 NOTE — REASON FOR VISIT
[Acute Exacerbation] : an acute exacerbation of [Syncope] : syncope [FreeTextEntry1] : Patient presents for reevaluation. Several weeks ago he had a syncopal episode. He states he awakened from a usual afternoon nap and then suddenly while walking lost consciousness. He states he believes he hit his head against the wall and I woke him up. He went to the emergency room he had an elevated d-dimer (patient had had recent back surgery) and was found to have a new right bundle branch block. He underwent CTA and pulmonary embolus was ruled out. He has felt well since that time. He was minimally anemic with a hemoglobin of 11.3. All of the blood work was normal. He has no prior history of same. Of note is the fact that he underwent CTA in the past and had normal coronary arteries

## 2019-06-20 NOTE — PHYSICAL EXAM
[General Appearance - Well Developed] : well developed [Normal Appearance] : normal appearance [Well Groomed] : well groomed [No Deformities] : no deformities [General Appearance - Well Nourished] : well nourished [General Appearance - In No Acute Distress] : no acute distress [Normal Oral Mucosa] : normal oral mucosa [No Oral Pallor] : no oral pallor [Normal Jugular Venous A Waves Present] : normal jugular venous A waves present [No Oral Cyanosis] : no oral cyanosis [Normal Jugular Venous V Waves Present] : normal jugular venous V waves present [No Jugular Venous Gonzales A Waves] : no jugular venous gonzales A waves [Respiration, Rhythm And Depth] : normal respiratory rhythm and effort [Exaggerated Use Of Accessory Muscles For Inspiration] : no accessory muscle use [Auscultation Breath Sounds / Voice Sounds] : lungs were clear to auscultation bilaterally [Abdomen Tenderness] : non-tender [Abdomen Soft] : soft [Abdomen Mass (___ Cm)] : no abdominal mass palpated [Abnormal Walk] : normal gait [Gait - Sufficient For Exercise Testing] : the gait was sufficient for exercise testing [Nail Clubbing] : no clubbing of the fingernails [Cyanosis, Localized] : no localized cyanosis [Petechial Hemorrhages (___cm)] : no petechial hemorrhages [] : no rash [Skin Color & Pigmentation] : normal skin color and pigmentation [No Venous Stasis] : no venous stasis [Skin Lesions] : no skin lesions [No Skin Ulcers] : no skin ulcer [No Xanthoma] : no  xanthoma was observed [Oriented To Time, Place, And Person] : oriented to person, place, and time [Affect] : the affect was normal [Mood] : the mood was normal [No Anxiety] : not feeling anxious [Normal Rate] : normal [Normal S1] : normal S1 [Normal S2] : normal S2 [S3] : no S3 [S4] : no S4 [No Murmur] : no murmurs heard [Right Carotid Bruit] : no bruit heard over the right carotid [Left Carotid Bruit] : no bruit heard over the left carotid [Right Femoral Bruit] : no bruit heard over the right femoral artery [Left Femoral Bruit] : no bruit heard over the left femoral artery [No Abnormalities] : the abdominal aorta was not enlarged and no bruit was heard [2+] : left 2+ [No Pitting Edema] : no pitting edema present

## 2019-06-20 NOTE — ASSESSMENT
[FreeTextEntry1] : In summary, the patient is a 58-year-old man with a one-time episode of syncope and a relatively new right bundle branch pattern. I referred him to the echocardiography laboratory the echo was entirely normal.\par \par We have had a long discussion with regards to the possible etiology of syncope. I have explained to him that he could have the beginnings of conduction disease. I have offered him several options including waiting or different types of monitoring the patient is inquiring about any other testing that could prove or disprove an electrical origin. I discussed the risks benefits and limitations electively on extremities.\par \par Ultimately we have decided to refer him to the electrophysiology service to assess for further workup. Consideration could be given to implantable loop recorder as well.

## 2019-06-20 NOTE — HISTORY OF PRESENT ILLNESS
[FreeTextEntry1] : I passed out [de-identified] : 2 weeks ago got up from lying down and passed out\par -banged his head\par -"it happened so quickly"\par \par Went to ER [facility]\par >had bloodwork, CT head all normal\par >-dimer came back high so had CT Chest clear\par >EKG was normal EXCEPT right BBB\par >ED labs reviewed, Hgb 11.2 ;\par >saw cardiologist \par EKG confirmed new RBB\par Advised to see EP MD Vazquez for loop recorder; \par

## 2019-06-20 NOTE — PHYSICAL EXAM
[No Acute Distress] : no acute distress [Well-Appearing] : well-appearing [Well Developed] : well developed [Well Nourished] : well nourished [PERRL] : pupils equal round and reactive to light [Normal Sclera/Conjunctiva] : normal sclera/conjunctiva [EOMI] : extraocular movements intact [Normal Outer Ear/Nose] : the outer ears and nose were normal in appearance [Normal Oropharynx] : the oropharynx was normal [No JVD] : no jugular venous distention [No Lymphadenopathy] : no lymphadenopathy [Supple] : supple [Thyroid Normal, No Nodules] : the thyroid was normal and there were no nodules present [No Accessory Muscle Use] : no accessory muscle use [No Respiratory Distress] : no respiratory distress  [Clear to Auscultation] : lungs were clear to auscultation bilaterally [Regular Rhythm] : with a regular rhythm [Normal Rate] : normal rate  [Normal S1, S2] : normal S1 and S2 [No Carotid Bruits] : no carotid bruits [No Murmur] : no murmur heard [No Varicosities] : no varicosities [No Abdominal Bruit] : a ~M bruit was not heard ~T in the abdomen [No Edema] : there was no peripheral edema [Pedal Pulses Present] : the pedal pulses are present [No Extremity Clubbing/Cyanosis] : no extremity clubbing/cyanosis [No Palpable Aorta] : no palpable aorta [Soft] : abdomen soft [Non Tender] : non-tender [Non-distended] : non-distended [No Masses] : no abdominal mass palpated [Normal Bowel Sounds] : normal bowel sounds [No HSM] : no HSM [Normal Anterior Cervical Nodes] : no anterior cervical lymphadenopathy [Normal Posterior Cervical Nodes] : no posterior cervical lymphadenopathy [No CVA Tenderness] : no CVA  tenderness [No Spinal Tenderness] : no spinal tenderness [No Joint Swelling] : no joint swelling [Grossly Normal Strength/Tone] : grossly normal strength/tone [No Rash] : no rash [Normal Gait] : normal gait [Coordination Grossly Intact] : coordination grossly intact [Deep Tendon Reflexes (DTR)] : deep tendon reflexes were 2+ and symmetric [No Focal Deficits] : no focal deficits [Normal Affect] : the affect was normal [Normal Insight/Judgement] : insight and judgment were intact

## 2019-07-01 ENCOUNTER — OTHER (OUTPATIENT)
Age: 59
End: 2019-07-01

## 2019-07-05 ENCOUNTER — OTHER (OUTPATIENT)
Age: 59
End: 2019-07-05

## 2019-07-15 ENCOUNTER — APPOINTMENT (OUTPATIENT)
Dept: ELECTROPHYSIOLOGY | Facility: CLINIC | Age: 59
End: 2019-07-15
Payer: COMMERCIAL

## 2019-07-15 ENCOUNTER — APPOINTMENT (OUTPATIENT)
Dept: INTERNAL MEDICINE | Facility: CLINIC | Age: 59
End: 2019-07-15
Payer: COMMERCIAL

## 2019-07-15 ENCOUNTER — NON-APPOINTMENT (OUTPATIENT)
Age: 59
End: 2019-07-15

## 2019-07-15 VITALS
OXYGEN SATURATION: 98 % | DIASTOLIC BLOOD PRESSURE: 83 MMHG | HEIGHT: 70 IN | SYSTOLIC BLOOD PRESSURE: 124 MMHG | HEART RATE: 67 BPM | WEIGHT: 178 LBS | BODY MASS INDEX: 25.48 KG/M2

## 2019-07-15 VITALS
RESPIRATION RATE: 15 BRPM | HEART RATE: 68 BPM | WEIGHT: 178 LBS | BODY MASS INDEX: 25.48 KG/M2 | DIASTOLIC BLOOD PRESSURE: 83 MMHG | SYSTOLIC BLOOD PRESSURE: 122 MMHG | HEIGHT: 70 IN

## 2019-07-15 DIAGNOSIS — I45.10 UNSPECIFIED RIGHT BUNDLE-BRANCH BLOCK: ICD-10-CM

## 2019-07-15 DIAGNOSIS — Z12.11 ENCOUNTER FOR SCREENING FOR MALIGNANT NEOPLASM OF COLON: ICD-10-CM

## 2019-07-15 PROCEDURE — 99245 OFF/OP CONSLTJ NEW/EST HI 55: CPT

## 2019-07-15 PROCEDURE — 93000 ELECTROCARDIOGRAM COMPLETE: CPT

## 2019-07-15 PROCEDURE — 99204 OFFICE O/P NEW MOD 45 MIN: CPT

## 2019-07-15 NOTE — HISTORY OF PRESENT ILLNESS
[FreeTextEntry1] : Comes to the office to schedule a screening colonoscopy referred by Dr. John Reyes [de-identified] : 58-year-old man comes to the office to schedule a screening colonoscopy referred by his PCP  DR REYES,,, past history is that of recent syncope and right bundle branch block BPH depression and attention deficit hyperactivity disorder and anemia he denies temperature chills sweats or myalgias he's had no headache sinus congestion sore throat cough wheezing pleurisy chest pain shortness of breath exertional dyspnea lightheadedness palpitations dizziness or vertigo he denies abdominal pain nausea vomiting diarrhea or constipation bright red blood per rectum or black stools appetite has been good his weight has been stable denies significant musculoskeletal complaints and has had no recent skin rashes episode of syncope being worked up presently

## 2019-07-15 NOTE — REVIEW OF SYSTEMS
[Fever] : no fever [Chills] : no chills [Fatigue] : no fatigue [Hot Flashes] : no hot flashes [Night Sweats] : no night sweats [Recent Change In Weight] : ~T no recent weight change [Discharge] : no discharge [Pain] : no pain [Redness] : no redness [Dryness] : no dryness [Vision Problems] : no vision problems [Itching] : no itching [Earache] : no earache [Hearing Loss] : no hearing loss [Nosebleeds] : no nosebleeds [Postnasal Drip] : no postnasal drip [Nasal Discharge] : no nasal discharge [Sore Throat] : no sore throat [Hoarseness] : no hoarseness [Chest Pain] : no chest pain [Palpitations] : no palpitations [Claudication] : no  leg claudication [Lower Ext Edema] : no lower extremity edema [Orthopena] : no orthopnea [Paroysmal Nocturnal Dyspnea] : no paroysmal nocturnal dyspnea [Shortness Of Breath] : no shortness of breath [Wheezing] : no wheezing [Cough] : no cough [Dyspnea on Exertion] : not dyspnea on exertion [Abdominal Pain] : no abdominal pain [Nausea] : no nausea [Constipation] : no constipation [Diarrhea] : no diarrhea [Vomiting] : no vomiting [Heartburn] : no heartburn [Melena] : no melena [Dysuria] : no dysuria [Incontinence] : no incontinence [Hesitancy] : no hesitancy [Nocturia] : nocturia [Hematuria] : no hematuria [Frequency] : no frequency [Impotence] : no impotency [Poor Libido] : libido not poor [Joint Pain] : no joint pain [Joint Stiffness] : no joint stiffness [Muscle Pain] : no muscle pain [Muscle Weakness] : no muscle weakness [Back Pain] : no back pain [Joint Swelling] : no joint swelling [Mole Changes] : no mole changes [Nail Changes] : no nail changes [Hair Changes] : no hair changes [Skin Rash] : no skin rash [Headache] : no headache [Dizziness] : no dizziness [Fainting] : fainting [Confusion] : no confusion [Unsteady Walk] : no ataxia [Memory Loss] : no memory loss [Suicidal] : not suicidal [Insomnia] : no insomnia [Anxiety] : no anxiety [Depression] : no depression [Easy Bleeding] : no easy bleeding [Easy Bruising] : no easy bruising [Swollen Glands] : no swollen glands [Negative] : Heme/Lymph

## 2019-07-15 NOTE — HISTORY OF PRESENT ILLNESS
[FreeTextEntry1] : Comes to the office to schedule a screening colonoscopy referred by Dr. John Reyes [de-identified] : 58-year-old man comes to the office to schedule a screening colonoscopy referred by his PCP  DR REYES,,, past history is that of recent syncope and right bundle branch block BPH depression and attention deficit hyperactivity disorder and anemia he denies temperature chills sweats or myalgias he's had no headache sinus congestion sore throat cough wheezing pleurisy chest pain shortness of breath exertional dyspnea lightheadedness palpitations dizziness or vertigo he denies abdominal pain nausea vomiting diarrhea or constipation bright red blood per rectum or black stools appetite has been good his weight has been stable denies significant musculoskeletal complaints and has had no recent skin rashes episode of syncope being worked up presently

## 2019-07-15 NOTE — ASSESSMENT
[FreeTextEntry1] : Physical exam shows a well-developed man in no acute distress blood pressure 122/83 height 5 feet 10 weight 178 pounds heart rate is 68 respirations of 15 HEENT was unremarkable chest was clear cardiovascular exam was regular abdomen was soft neuro nonfocal colonoscopy scheduled for August of 2019 MiraLax/Gatorade preparation was reviewed with the patient and all his questions were answered aware of the need for ride home from the procedure and that he must stop all blood thinning medications herbs and vitamins within 7 days of the procedure he is also aware of the need for no driving for 24 hours after the colonoscopy risk of bleeding and perforation were described to the patient all his questions were answered he is aware of the need to have a medical clearance from his cardiologist and medical doctor prior to proceeding with the procedure

## 2019-07-15 NOTE — HEALTH RISK ASSESSMENT
[] : No [Yes] : Yes [One fall no injury in past year] : Patient reported one fall in the past year without injury [0] : 2) Feeling down, depressed, or hopeless: Not at all (0) [AET8Kcfns] : 0

## 2019-07-15 NOTE — HEALTH RISK ASSESSMENT
[] : No [Yes] : Yes [One fall no injury in past year] : Patient reported one fall in the past year without injury [0] : 2) Feeling down, depressed, or hopeless: Not at all (0) [LBE2Douci] : 0

## 2019-07-16 NOTE — HISTORY OF PRESENT ILLNESS
[FreeTextEntry1] : Referring Physician: Allan Enriquez, ME\par \par Dear Ed:\par \par Mr. Jose Oneil was seen in the [facility] today. For our records, please allow me to summarize the history and my findings.\par \par This pleasant 58 year old man has no significant cardiovascular history. Medical history is significant for BPH on alpha blockers and erectile dysfunction on daily Cialis. ECG as of 8/2018 was notable for an incomplete RBBB, now complete on more recent ECG. He presents today for an episode of syncope. \par \par Several weeks ago he woke up after taking a nap. While walking down the hallway immediately after arising from bed he had sudden loss of consciousness without prodrome. He awoke on the floor having suffered some mild trauma but otherwise feeling back to baseline. He notes no usual dizziness and noted nothing out of the ordinary on the day of the episode.\par \par TTE (6/19) was structurally normal. Coronary CT (2/19) showed normal coronary anatomy. Mr. Oneil denies any recent history of chest pain, shortness of breath, palpitations, dizziness, or syncope.

## 2019-07-22 ENCOUNTER — MEDICATION RENEWAL (OUTPATIENT)
Age: 59
End: 2019-07-22

## 2019-08-01 ENCOUNTER — APPOINTMENT (OUTPATIENT)
Dept: CARDIOLOGY | Facility: CLINIC | Age: 59
End: 2019-08-01

## 2019-08-13 ENCOUNTER — APPOINTMENT (OUTPATIENT)
Dept: INTERNAL MEDICINE | Facility: CLINIC | Age: 59
End: 2019-08-13

## 2019-08-19 ENCOUNTER — APPOINTMENT (OUTPATIENT)
Dept: INTERNAL MEDICINE | Facility: HOSPITAL | Age: 59
End: 2019-08-19

## 2019-09-10 ENCOUNTER — MEDICATION RENEWAL (OUTPATIENT)
Age: 59
End: 2019-09-10

## 2019-09-10 RX ORDER — DEXTROAMPHETAMINE SACCHARATE, AMPHETAMINE ASPARTATE MONOHYDRATE, DEXTROAMPHETAMINE SULFATE AND AMPHETAMINE SULFATE 3.75; 3.75; 3.75; 3.75 MG/1; MG/1; MG/1; MG/1
15 CAPSULE, EXTENDED RELEASE ORAL TWICE DAILY
Qty: 60 | Refills: 0 | Status: DISCONTINUED | COMMUNITY
Start: 2019-06-20 | End: 2019-09-10

## 2019-09-19 ENCOUNTER — APPOINTMENT (OUTPATIENT)
Dept: INTERNAL MEDICINE | Facility: CLINIC | Age: 59
End: 2019-09-19
Payer: COMMERCIAL

## 2019-09-19 VITALS
RESPIRATION RATE: 16 BRPM | WEIGHT: 182 LBS | HEART RATE: 98 BPM | OXYGEN SATURATION: 99 % | DIASTOLIC BLOOD PRESSURE: 70 MMHG | HEIGHT: 75 IN | BODY MASS INDEX: 22.63 KG/M2 | SYSTOLIC BLOOD PRESSURE: 120 MMHG | TEMPERATURE: 98.3 F

## 2019-09-19 PROCEDURE — 99213 OFFICE O/P EST LOW 20 MIN: CPT

## 2019-09-30 ENCOUNTER — RESULT REVIEW (OUTPATIENT)
Age: 59
End: 2019-09-30

## 2019-09-30 ENCOUNTER — OUTPATIENT (OUTPATIENT)
Dept: OUTPATIENT SERVICES | Facility: HOSPITAL | Age: 59
LOS: 1 days | End: 2019-09-30
Payer: COMMERCIAL

## 2019-09-30 ENCOUNTER — APPOINTMENT (OUTPATIENT)
Dept: INTERNAL MEDICINE | Facility: HOSPITAL | Age: 59
End: 2019-09-30
Payer: COMMERCIAL

## 2019-09-30 DIAGNOSIS — Z96.649 PRESENCE OF UNSPECIFIED ARTIFICIAL HIP JOINT: Chronic | ICD-10-CM

## 2019-09-30 DIAGNOSIS — Z12.11 ENCOUNTER FOR SCREENING FOR MALIGNANT NEOPLASM OF COLON: ICD-10-CM

## 2019-09-30 PROCEDURE — 88305 TISSUE EXAM BY PATHOLOGIST: CPT | Mod: 26

## 2019-09-30 PROCEDURE — 88305 TISSUE EXAM BY PATHOLOGIST: CPT

## 2019-09-30 PROCEDURE — 45380 COLONOSCOPY AND BIOPSY: CPT | Mod: PT

## 2019-09-30 PROCEDURE — 45380 COLONOSCOPY AND BIOPSY: CPT

## 2019-09-30 RX ORDER — SODIUM CHLORIDE 9 MG/ML
1000 INJECTION INTRAMUSCULAR; INTRAVENOUS; SUBCUTANEOUS
Refills: 0 | Status: DISCONTINUED | OUTPATIENT
Start: 2019-09-30 | End: 2019-10-19

## 2019-09-30 RX ADMIN — SODIUM CHLORIDE 75 MILLILITER(S): 9 INJECTION INTRAMUSCULAR; INTRAVENOUS; SUBCUTANEOUS at 07:25

## 2019-10-04 RX ORDER — DEXTROAMPHETAMINE SACCHARATE, AMPHETAMINE ASPARTATE MONOHYDRATE, DEXTROAMPHETAMINE SULFATE AND AMPHETAMINE SULFATE 1.25; 1.25; 1.25; 1.25 MG/1; MG/1; MG/1; MG/1
5 CAPSULE, EXTENDED RELEASE ORAL
Qty: 60 | Refills: 0 | Status: DISCONTINUED | COMMUNITY
Start: 2019-09-19 | End: 2019-10-04

## 2019-10-07 ENCOUNTER — MEDICATION RENEWAL (OUTPATIENT)
Age: 59
End: 2019-10-07

## 2019-10-11 ENCOUNTER — APPOINTMENT (OUTPATIENT)
Dept: INTERNAL MEDICINE | Facility: CLINIC | Age: 59
End: 2019-10-11

## 2019-10-22 ENCOUNTER — APPOINTMENT (OUTPATIENT)
Dept: INTERNAL MEDICINE | Facility: CLINIC | Age: 59
End: 2019-10-22
Payer: COMMERCIAL

## 2019-10-22 VITALS
HEIGHT: 70 IN | RESPIRATION RATE: 17 BRPM | WEIGHT: 189 LBS | SYSTOLIC BLOOD PRESSURE: 120 MMHG | OXYGEN SATURATION: 98 % | DIASTOLIC BLOOD PRESSURE: 64 MMHG | TEMPERATURE: 98.5 F | HEART RATE: 87 BPM

## 2019-10-22 PROCEDURE — 99214 OFFICE O/P EST MOD 30 MIN: CPT

## 2019-10-22 NOTE — PHYSICAL EXAM
[No Acute Distress] : no acute distress [Well Nourished] : well nourished [Well Developed] : well developed [Well-Appearing] : well-appearing [PERRL] : pupils equal round and reactive to light [Normal Sclera/Conjunctiva] : normal sclera/conjunctiva [Normal Outer Ear/Nose] : the outer ears and nose were normal in appearance [EOMI] : extraocular movements intact [Normal Oropharynx] : the oropharynx was normal [No JVD] : no jugular venous distention [No Lymphadenopathy] : no lymphadenopathy [Supple] : supple [No Respiratory Distress] : no respiratory distress  [Thyroid Normal, No Nodules] : the thyroid was normal and there were no nodules present [No Accessory Muscle Use] : no accessory muscle use [Clear to Auscultation] : lungs were clear to auscultation bilaterally [Normal Rate] : normal rate  [Regular Rhythm] : with a regular rhythm [Normal S1, S2] : normal S1 and S2 [No Murmur] : no murmur heard [No Carotid Bruits] : no carotid bruits [No Abdominal Bruit] : a ~M bruit was not heard ~T in the abdomen [No Varicosities] : no varicosities [Pedal Pulses Present] : the pedal pulses are present [No Edema] : there was no peripheral edema [No Palpable Aorta] : no palpable aorta [No Extremity Clubbing/Cyanosis] : no extremity clubbing/cyanosis [Soft] : abdomen soft [Non-distended] : non-distended [Non Tender] : non-tender [No HSM] : no HSM [No Masses] : no abdominal mass palpated [Normal Bowel Sounds] : normal bowel sounds [Normal Posterior Cervical Nodes] : no posterior cervical lymphadenopathy [Normal Anterior Cervical Nodes] : no anterior cervical lymphadenopathy [No Spinal Tenderness] : no spinal tenderness [No CVA Tenderness] : no CVA  tenderness [No Joint Swelling] : no joint swelling [Grossly Normal Strength/Tone] : grossly normal strength/tone [Coordination Grossly Intact] : coordination grossly intact [No Rash] : no rash [No Focal Deficits] : no focal deficits [Normal Gait] : normal gait [Normal Affect] : the affect was normal [Deep Tendon Reflexes (DTR)] : deep tendon reflexes were 2+ and symmetric [Normal Insight/Judgement] : insight and judgment were intact

## 2019-10-22 NOTE — ASSESSMENT
[FreeTextEntry1] : >WWill not need 10 mg renewal for a month and adivsed patient that if he lowers dose to bring any remaining piills here for count and disposal\par -as 55 of the 5mg and will take 2 BID 13 days worth and has 10 mg BID filled Oct 8th

## 2019-11-01 ENCOUNTER — RX RENEWAL (OUTPATIENT)
Age: 59
End: 2019-11-01

## 2019-11-04 ENCOUNTER — MEDICATION RENEWAL (OUTPATIENT)
Age: 59
End: 2019-11-04

## 2019-11-29 ENCOUNTER — MEDICATION RENEWAL (OUTPATIENT)
Age: 59
End: 2019-11-29

## 2019-12-26 ENCOUNTER — MEDICATION RENEWAL (OUTPATIENT)
Age: 59
End: 2019-12-26

## 2020-01-15 ENCOUNTER — APPOINTMENT (OUTPATIENT)
Dept: INTERNAL MEDICINE | Facility: CLINIC | Age: 60
End: 2020-01-15
Payer: COMMERCIAL

## 2020-01-15 VITALS
TEMPERATURE: 97.2 F | OXYGEN SATURATION: 97 % | DIASTOLIC BLOOD PRESSURE: 70 MMHG | HEIGHT: 70 IN | BODY MASS INDEX: 25.62 KG/M2 | HEART RATE: 90 BPM | RESPIRATION RATE: 16 BRPM | SYSTOLIC BLOOD PRESSURE: 120 MMHG | WEIGHT: 179 LBS

## 2020-01-15 PROCEDURE — 99214 OFFICE O/P EST MOD 30 MIN: CPT

## 2020-01-15 RX ORDER — DEXTROAMPHETAMINE SACCHARATE, AMPHETAMINE ASPARTATE MONOHYDRATE, DEXTROAMPHETAMINE SULFATE AND AMPHETAMINE SULFATE 2.5; 2.5; 2.5; 2.5 MG/1; MG/1; MG/1; MG/1
10 CAPSULE, EXTENDED RELEASE ORAL
Qty: 60 | Refills: 0 | Status: DISCONTINUED | COMMUNITY
Start: 2019-09-10 | End: 2020-01-15

## 2020-01-15 NOTE — PHYSICAL EXAM
[No Acute Distress] : no acute distress [Well Nourished] : well nourished [Well Developed] : well developed [Well-Appearing] : well-appearing [Normal Sclera/Conjunctiva] : normal sclera/conjunctiva [PERRL] : pupils equal round and reactive to light [EOMI] : extraocular movements intact [Normal Oropharynx] : the oropharynx was normal [Normal Outer Ear/Nose] : the outer ears and nose were normal in appearance [No JVD] : no jugular venous distention [No Lymphadenopathy] : no lymphadenopathy [Supple] : supple [No Respiratory Distress] : no respiratory distress  [Thyroid Normal, No Nodules] : the thyroid was normal and there were no nodules present [No Accessory Muscle Use] : no accessory muscle use [Clear to Auscultation] : lungs were clear to auscultation bilaterally [Normal Rate] : normal rate  [Regular Rhythm] : with a regular rhythm [Normal S1, S2] : normal S1 and S2 [No Murmur] : no murmur heard [No Carotid Bruits] : no carotid bruits [No Abdominal Bruit] : a ~M bruit was not heard ~T in the abdomen [No Varicosities] : no varicosities [Pedal Pulses Present] : the pedal pulses are present [No Edema] : there was no peripheral edema [No Palpable Aorta] : no palpable aorta [Soft] : abdomen soft [No Extremity Clubbing/Cyanosis] : no extremity clubbing/cyanosis [Non Tender] : non-tender [Non-distended] : non-distended [Normal Bowel Sounds] : normal bowel sounds [No Masses] : no abdominal mass palpated [No HSM] : no HSM [Normal Posterior Cervical Nodes] : no posterior cervical lymphadenopathy [No CVA Tenderness] : no CVA  tenderness [Normal Anterior Cervical Nodes] : no anterior cervical lymphadenopathy [No Spinal Tenderness] : no spinal tenderness [No Joint Swelling] : no joint swelling [Grossly Normal Strength/Tone] : grossly normal strength/tone [Coordination Grossly Intact] : coordination grossly intact [No Rash] : no rash [Normal Gait] : normal gait [No Focal Deficits] : no focal deficits [Normal Insight/Judgement] : insight and judgment were intact [Deep Tendon Reflexes (DTR)] : deep tendon reflexes were 2+ and symmetric [Normal Affect] : the affect was normal

## 2020-01-15 NOTE — HISTORY OF PRESENT ILLNESS
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

## 2020-02-05 ENCOUNTER — APPOINTMENT (OUTPATIENT)
Dept: INTERNAL MEDICINE | Facility: CLINIC | Age: 60
End: 2020-02-05
Payer: COMMERCIAL

## 2020-02-05 VITALS
RESPIRATION RATE: 16 BRPM | SYSTOLIC BLOOD PRESSURE: 120 MMHG | HEIGHT: 70 IN | DIASTOLIC BLOOD PRESSURE: 70 MMHG | HEART RATE: 90 BPM | OXYGEN SATURATION: 97 % | TEMPERATURE: 98.4 F | BODY MASS INDEX: 25.62 KG/M2 | WEIGHT: 179 LBS

## 2020-02-05 DIAGNOSIS — I34.0 NONRHEUMATIC MITRAL (VALVE) INSUFFICIENCY: ICD-10-CM

## 2020-02-05 PROCEDURE — 99214 OFFICE O/P EST MOD 30 MIN: CPT

## 2020-02-05 RX ORDER — DEXTROAMPHETAMINE SULFATE, DEXTROAMPHETAMINE SACCHARATE, AMPHETAMINE SULFATE AND AMPHETAMINE ASPARTATE 3.75; 3.75; 3.75; 3.75 MG/1; MG/1; MG/1; MG/1
15 CAPSULE, EXTENDED RELEASE ORAL TWICE DAILY
Qty: 60 | Refills: 0 | Status: DISCONTINUED | COMMUNITY
Start: 2020-01-15 | End: 2020-02-05

## 2020-02-05 NOTE — PHYSICAL EXAM
[Well Nourished] : well nourished [No Acute Distress] : no acute distress [Well Developed] : well developed [Well-Appearing] : well-appearing [PERRL] : pupils equal round and reactive to light [Normal Sclera/Conjunctiva] : normal sclera/conjunctiva [Normal Outer Ear/Nose] : the outer ears and nose were normal in appearance [EOMI] : extraocular movements intact [Normal Oropharynx] : the oropharynx was normal [No JVD] : no jugular venous distention [No Lymphadenopathy] : no lymphadenopathy [Thyroid Normal, No Nodules] : the thyroid was normal and there were no nodules present [Supple] : supple [No Respiratory Distress] : no respiratory distress  [Clear to Auscultation] : lungs were clear to auscultation bilaterally [No Accessory Muscle Use] : no accessory muscle use [Normal Rate] : normal rate  [Regular Rhythm] : with a regular rhythm [Normal S1, S2] : normal S1 and S2 [No Murmur] : no murmur heard [No Carotid Bruits] : no carotid bruits [No Abdominal Bruit] : a ~M bruit was not heard ~T in the abdomen [No Varicosities] : no varicosities [Pedal Pulses Present] : the pedal pulses are present [No Edema] : there was no peripheral edema [No Palpable Aorta] : no palpable aorta [No Extremity Clubbing/Cyanosis] : no extremity clubbing/cyanosis [Soft] : abdomen soft [Non Tender] : non-tender [Non-distended] : non-distended [No Masses] : no abdominal mass palpated [No HSM] : no HSM [Normal Bowel Sounds] : normal bowel sounds [Normal Posterior Cervical Nodes] : no posterior cervical lymphadenopathy [Normal Anterior Cervical Nodes] : no anterior cervical lymphadenopathy [No CVA Tenderness] : no CVA  tenderness [No Spinal Tenderness] : no spinal tenderness [No Joint Swelling] : no joint swelling [Grossly Normal Strength/Tone] : grossly normal strength/tone [No Rash] : no rash [Coordination Grossly Intact] : coordination grossly intact [No Focal Deficits] : no focal deficits [Normal Gait] : normal gait [Deep Tendon Reflexes (DTR)] : deep tendon reflexes were 2+ and symmetric [Normal Affect] : the affect was normal [Normal Insight/Judgement] : insight and judgment were intact

## 2020-03-15 ENCOUNTER — EMERGENCY (EMERGENCY)
Facility: HOSPITAL | Age: 60
LOS: 1 days | Discharge: HOME | End: 2020-03-15
Attending: EMERGENCY MEDICINE | Admitting: EMERGENCY MEDICINE
Payer: COMMERCIAL

## 2020-03-15 VITALS
DIASTOLIC BLOOD PRESSURE: 83 MMHG | HEART RATE: 84 BPM | SYSTOLIC BLOOD PRESSURE: 142 MMHG | TEMPERATURE: 97 F | RESPIRATION RATE: 17 BRPM | OXYGEN SATURATION: 97 % | WEIGHT: 181 LBS | HEIGHT: 70 IN

## 2020-03-15 DIAGNOSIS — Z96.649 PRESENCE OF UNSPECIFIED ARTIFICIAL HIP JOINT: Chronic | ICD-10-CM

## 2020-03-15 DIAGNOSIS — Z03.818 ENCOUNTER FOR OBSERVATION FOR SUSPECTED EXPOSURE TO OTHER BIOLOGICAL AGENTS RULED OUT: ICD-10-CM

## 2020-03-15 PROCEDURE — 99282 EMERGENCY DEPT VISIT SF MDM: CPT

## 2020-03-15 PROCEDURE — 99283 EMERGENCY DEPT VISIT LOW MDM: CPT

## 2020-03-15 NOTE — ED PROVIDER NOTE - CLINICAL SUMMARY MEDICAL DECISION MAKING FREE TEXT BOX
Pt asymptomatic with +covid contacts. discussed at length with pt testing only if symptomatic. Discussed with patient need to return to ED if symptoms don't continue to improve or recur or develops any new or worsening symptoms that are of concern.

## 2020-03-15 NOTE — ED PROVIDER NOTE - ATTENDING CONTRIBUTION TO CARE
pt concerned for COVID-19 as he was in close contact with his daughter few days ago who was sick and became + for COVID-19.  pt has no sxs. no fever/chills, cough, rhinorrhea, sore throat, myalgias, sob.    exam:   General: well appearing, NAD.   HEENT: eyes perrl, nose normal, OP no erythema/exudate/swelling.   cor: RRR, s1s2, 2+rad pulses.   lungs: ctabl, no resp distress.   abd: soft, ntnd.   neuro: a&ox3, cn2-12 intact, FONSECA, 5/5 strength c nl sensation all extremities, nl coordination.   MSK: no extremity swelling.  Skin: normal, no rash    AP: +covid-19 contact but asymptomatic. counseled pt on COVID19 sxs and told to f/u pmd or return for sxs/concerns

## 2020-03-15 NOTE — ED PROVIDER NOTE - NSFOLLOWUPINSTRUCTIONS_ED_ALL_ED_FT
See attached for COVID-19 info / fact sheet.   Please stay home for 14 days. See work note and fact sheet.   Home quarantine is recommended to monitor symptoms.   Worsening, continued or ANY new concerning symptoms return to the emergency department.     If you have any worsening of symptoms or any other concerns please see your doctor or return to the ED immediately.    Please continue taking your home medications as directed.  Do not use alcohol when taking any medication (especially antibiotics, tylenol or other pain medication) unless you check with the doctor or pharmacist.

## 2020-03-15 NOTE — ED ADULT NURSE NOTE - NSIMPLEMENTINTERV_GEN_ALL_ED
Implemented All Fall with Harm Risk Interventions:  [place] to call system. Call bell, personal items and telephone within reach. Instruct patient to call for assistance. Room bathroom lighting operational. Non-slip footwear when patient is off stretcher. Physically safe environment: no spills, clutter or unnecessary equipment. Stretcher in lowest position, wheels locked, appropriate side rails in place. Provide visual cue, wrist band, yellow gown, etc. Monitor gait and stability. Monitor for mental status changes and reorient to person, place, and time. Review medications for side effects contributing to fall risk. Reinforce activity limits and safety measures with patient and family. Provide visual clues: red socks.

## 2020-03-15 NOTE — ED ADULT TRIAGE NOTE - CHIEF COMPLAINT QUOTE
Pt stated he was contact with his daughter who was + for the corona virus and would like to be tested, pt with mild sore throat

## 2020-03-15 NOTE — ED PROVIDER NOTE - OBJECTIVE STATEMENT
pt 58 yo m here for covid testing. pts daughter is positive for covid-19 and is worried because he was with her 3 days ago. despite triage note stating he has a sore throat he states he actually has no symptoms  denies cp, sob, cough, sore throat, fever, chills, n/v/abd pain, rash

## 2020-03-15 NOTE — ED PROVIDER NOTE - PATIENT PORTAL LINK FT
You can access the FollowMyHealth Patient Portal offered by [facility] by registering at the following website: [email or web address]. By joining [facility]’s FollowMyHealth portal, you will also be able to view your health information using other applications (apps) compatible with our system.

## 2020-03-19 ENCOUNTER — APPOINTMENT (OUTPATIENT)
Dept: INTERNAL MEDICINE | Facility: CLINIC | Age: 60
End: 2020-03-19
Payer: COMMERCIAL

## 2020-03-19 VITALS
OXYGEN SATURATION: 98 % | TEMPERATURE: 98.1 F | WEIGHT: 181 LBS | HEIGHT: 70 IN | BODY MASS INDEX: 25.91 KG/M2 | HEART RATE: 71 BPM | RESPIRATION RATE: 16 BRPM | DIASTOLIC BLOOD PRESSURE: 60 MMHG | SYSTOLIC BLOOD PRESSURE: 110 MMHG

## 2020-03-19 PROCEDURE — 99213 OFFICE O/P EST LOW 20 MIN: CPT

## 2020-03-19 RX ORDER — DEXTROAMPHETAMINE SULFATE, DEXTROAMPHETAMINE SACCHARATE, AMPHETAMINE SULFATE AND AMPHETAMINE ASPARTATE 5; 5; 5; 5 MG/1; MG/1; MG/1; MG/1
20 CAPSULE, EXTENDED RELEASE ORAL
Qty: 60 | Refills: 0 | Status: DISCONTINUED | COMMUNITY
Start: 2020-02-05 | End: 2020-03-19

## 2020-03-19 RX ORDER — DEXTROAMPHETAMINE SACCHARATE, AMPHETAMINE ASPARTATE MONOHYDRATE, DEXTROAMPHETAMINE SULFATE AND AMPHETAMINE SULFATE 7.5; 7.5; 7.5; 7.5 MG/1; MG/1; MG/1; MG/1
30 CAPSULE, EXTENDED RELEASE ORAL TWICE DAILY
Qty: 60 | Refills: 0 | Status: DISCONTINUED | COMMUNITY
Start: 2020-03-19 | End: 2020-03-19

## 2020-03-19 NOTE — HISTORY OF PRESENT ILLNESS
[FreeTextEntry1] : ADHD [de-identified] : Concerned about meds has been "mismanageing rx"\par Gian Bal had laid it out to him that he was not doing the right thing\par -he acknowledges that its a problem to switch \par wife states he's sleeping a lot after taking it \par

## 2020-04-13 ENCOUNTER — APPOINTMENT (OUTPATIENT)
Dept: INTERNAL MEDICINE | Facility: CLINIC | Age: 60
End: 2020-04-13
Payer: COMMERCIAL

## 2020-04-13 VITALS
HEIGHT: 70 IN | DIASTOLIC BLOOD PRESSURE: 70 MMHG | SYSTOLIC BLOOD PRESSURE: 130 MMHG | OXYGEN SATURATION: 96 % | WEIGHT: 181 LBS | BODY MASS INDEX: 25.91 KG/M2 | TEMPERATURE: 97.7 F | HEART RATE: 92 BPM | RESPIRATION RATE: 17 BRPM

## 2020-04-13 PROCEDURE — 99213 OFFICE O/P EST LOW 20 MIN: CPT

## 2020-04-13 NOTE — HISTORY OF PRESENT ILLNESS
[FreeTextEntry1] : Here for follow up  [de-identified] : ADHD renewal\par Per last visit trying to maintain\par \par Working well with current dose\par not having prolonged "crashes"\par \par Red left eye

## 2020-05-13 ENCOUNTER — APPOINTMENT (OUTPATIENT)
Dept: INTERNAL MEDICINE | Facility: CLINIC | Age: 60
End: 2020-05-13
Payer: COMMERCIAL

## 2020-05-13 VITALS
OXYGEN SATURATION: 96 % | WEIGHT: 180 LBS | RESPIRATION RATE: 16 BRPM | DIASTOLIC BLOOD PRESSURE: 64 MMHG | HEART RATE: 76 BPM | SYSTOLIC BLOOD PRESSURE: 116 MMHG | BODY MASS INDEX: 25.77 KG/M2 | TEMPERATURE: 98.5 F | HEIGHT: 70 IN

## 2020-05-13 PROCEDURE — 99213 OFFICE O/P EST LOW 20 MIN: CPT

## 2020-05-13 NOTE — HISTORY OF PRESENT ILLNESS
[FreeTextEntry1] : Here for follow up  [de-identified] : Doing OK with social isolation getting along "better" with his family\par \par

## 2020-06-08 ENCOUNTER — APPOINTMENT (OUTPATIENT)
Dept: INTERNAL MEDICINE | Facility: CLINIC | Age: 60
End: 2020-06-08
Payer: COMMERCIAL

## 2020-06-08 VITALS
HEART RATE: 76 BPM | DIASTOLIC BLOOD PRESSURE: 70 MMHG | TEMPERATURE: 98.3 F | HEIGHT: 70 IN | RESPIRATION RATE: 18 BRPM | BODY MASS INDEX: 26.2 KG/M2 | OXYGEN SATURATION: 97 % | SYSTOLIC BLOOD PRESSURE: 120 MMHG | WEIGHT: 183 LBS

## 2020-06-08 DIAGNOSIS — E78.5 HYPERLIPIDEMIA, UNSPECIFIED: ICD-10-CM

## 2020-06-08 PROCEDURE — 99213 OFFICE O/P EST LOW 20 MIN: CPT

## 2020-06-08 NOTE — PHYSICAL EXAM
[No Acute Distress] : no acute distress [Well Nourished] : well nourished [Well Developed] : well developed [Well-Appearing] : well-appearing [PERRL] : pupils equal round and reactive to light [Normal Sclera/Conjunctiva] : normal sclera/conjunctiva [EOMI] : extraocular movements intact [Normal Outer Ear/Nose] : the outer ears and nose were normal in appearance [Normal Oropharynx] : the oropharynx was normal [No JVD] : no jugular venous distention [No Lymphadenopathy] : no lymphadenopathy [Supple] : supple [Thyroid Normal, No Nodules] : the thyroid was normal and there were no nodules present [No Respiratory Distress] : no respiratory distress  [No Accessory Muscle Use] : no accessory muscle use [Normal Rate] : normal rate  [Clear to Auscultation] : lungs were clear to auscultation bilaterally [Regular Rhythm] : with a regular rhythm [Normal S1, S2] : normal S1 and S2 [No Murmur] : no murmur heard [No Carotid Bruits] : no carotid bruits [No Abdominal Bruit] : a ~M bruit was not heard ~T in the abdomen [No Varicosities] : no varicosities [Pedal Pulses Present] : the pedal pulses are present [No Edema] : there was no peripheral edema [No Palpable Aorta] : no palpable aorta [No Extremity Clubbing/Cyanosis] : no extremity clubbing/cyanosis [Soft] : abdomen soft [Non Tender] : non-tender [Non-distended] : non-distended [No Masses] : no abdominal mass palpated [No HSM] : no HSM [Normal Bowel Sounds] : normal bowel sounds [Normal Posterior Cervical Nodes] : no posterior cervical lymphadenopathy [Normal Anterior Cervical Nodes] : no anterior cervical lymphadenopathy [No Spinal Tenderness] : no spinal tenderness [No CVA Tenderness] : no CVA  tenderness [No Joint Swelling] : no joint swelling [Grossly Normal Strength/Tone] : grossly normal strength/tone [No Rash] : no rash [Coordination Grossly Intact] : coordination grossly intact [No Focal Deficits] : no focal deficits [Normal Gait] : normal gait [Deep Tendon Reflexes (DTR)] : deep tendon reflexes were 2+ and symmetric [Normal Insight/Judgement] : insight and judgment were intact [Normal Affect] : the affect was normal

## 2020-07-08 ENCOUNTER — APPOINTMENT (OUTPATIENT)
Dept: INTERNAL MEDICINE | Facility: CLINIC | Age: 60
End: 2020-07-08
Payer: COMMERCIAL

## 2020-07-08 VITALS
DIASTOLIC BLOOD PRESSURE: 70 MMHG | SYSTOLIC BLOOD PRESSURE: 120 MMHG | TEMPERATURE: 98.3 F | OXYGEN SATURATION: 98 % | BODY MASS INDEX: 26.2 KG/M2 | RESPIRATION RATE: 18 BRPM | WEIGHT: 183 LBS | HEIGHT: 70 IN | HEART RATE: 61 BPM

## 2020-07-08 PROCEDURE — 99213 OFFICE O/P EST LOW 20 MIN: CPT

## 2020-07-08 NOTE — PHYSICAL EXAM
[No Acute Distress] : no acute distress [Well Nourished] : well nourished [Well Developed] : well developed [Well-Appearing] : well-appearing [Normal Sclera/Conjunctiva] : normal sclera/conjunctiva [EOMI] : extraocular movements intact [PERRL] : pupils equal round and reactive to light [Normal Oropharynx] : the oropharynx was normal [Normal Outer Ear/Nose] : the outer ears and nose were normal in appearance [No Lymphadenopathy] : no lymphadenopathy [No JVD] : no jugular venous distention [Supple] : supple [No Respiratory Distress] : no respiratory distress  [Thyroid Normal, No Nodules] : the thyroid was normal and there were no nodules present [Normal Rate] : normal rate  [Clear to Auscultation] : lungs were clear to auscultation bilaterally [No Accessory Muscle Use] : no accessory muscle use [Regular Rhythm] : with a regular rhythm [Normal S1, S2] : normal S1 and S2 [No Murmur] : no murmur heard [No Carotid Bruits] : no carotid bruits [No Abdominal Bruit] : a ~M bruit was not heard ~T in the abdomen [Pedal Pulses Present] : the pedal pulses are present [No Varicosities] : no varicosities [No Extremity Clubbing/Cyanosis] : no extremity clubbing/cyanosis [No Edema] : there was no peripheral edema [No Palpable Aorta] : no palpable aorta [Non Tender] : non-tender [Soft] : abdomen soft [Non-distended] : non-distended [No Masses] : no abdominal mass palpated [Normal Posterior Cervical Nodes] : no posterior cervical lymphadenopathy [Normal Bowel Sounds] : normal bowel sounds [No HSM] : no HSM [No CVA Tenderness] : no CVA  tenderness [No Spinal Tenderness] : no spinal tenderness [Normal Anterior Cervical Nodes] : no anterior cervical lymphadenopathy [Grossly Normal Strength/Tone] : grossly normal strength/tone [No Joint Swelling] : no joint swelling [No Rash] : no rash [Coordination Grossly Intact] : coordination grossly intact [Deep Tendon Reflexes (DTR)] : deep tendon reflexes were 2+ and symmetric [Normal Gait] : normal gait [No Focal Deficits] : no focal deficits [Normal Affect] : the affect was normal [Normal Insight/Judgement] : insight and judgment were intact

## 2020-10-02 ENCOUNTER — APPOINTMENT (OUTPATIENT)
Dept: INTERNAL MEDICINE | Facility: CLINIC | Age: 60
End: 2020-10-02
Payer: COMMERCIAL

## 2020-10-02 VITALS
OXYGEN SATURATION: 98 % | HEART RATE: 80 BPM | SYSTOLIC BLOOD PRESSURE: 120 MMHG | RESPIRATION RATE: 18 BRPM | BODY MASS INDEX: 26.2 KG/M2 | WEIGHT: 183 LBS | TEMPERATURE: 98 F | DIASTOLIC BLOOD PRESSURE: 70 MMHG | HEIGHT: 70 IN

## 2020-10-02 DIAGNOSIS — Z23 ENCOUNTER FOR IMMUNIZATION: ICD-10-CM

## 2020-10-02 DIAGNOSIS — D64.9 ANEMIA, UNSPECIFIED: ICD-10-CM

## 2020-10-02 LAB
25(OH)D3 SERPL-MCNC: 28.5 NG/ML
ALBUMIN SERPL ELPH-MCNC: 4.3 G/DL
ALP BLD-CCNC: 95 U/L
ALT SERPL-CCNC: 22 U/L
ANION GAP SERPL CALC-SCNC: 12 MMOL/L
APPEARANCE: CLEAR
AST SERPL-CCNC: 17 U/L
BACTERIA: NEGATIVE
BASOPHILS # BLD AUTO: 0.04 K/UL
BASOPHILS NFR BLD AUTO: 0.6 %
BILIRUB SERPL-MCNC: 0.4 MG/DL
BILIRUBIN URINE: NEGATIVE
BLOOD URINE: NEGATIVE
BUN SERPL-MCNC: 16 MG/DL
CALCIUM SERPL-MCNC: 9 MG/DL
CHLORIDE SERPL-SCNC: 106 MMOL/L
CHOLEST SERPL-MCNC: 238 MG/DL
CHOLEST/HDLC SERPL: 5.7 RATIO
CO2 SERPL-SCNC: 24 MMOL/L
COLOR: NORMAL
CREAT SERPL-MCNC: 0.93 MG/DL
EOSINOPHIL # BLD AUTO: 0.18 K/UL
EOSINOPHIL NFR BLD AUTO: 2.7 %
ESTIMATED AVERAGE GLUCOSE: 117 MG/DL
GLUCOSE QUALITATIVE U: NEGATIVE
GLUCOSE SERPL-MCNC: 105 MG/DL
HBA1C MFR BLD HPLC: 5.7 %
HCT VFR BLD CALC: 42.2 %
HDLC SERPL-MCNC: 42 MG/DL
HGB BLD-MCNC: 12.5 G/DL
HYALINE CASTS: 0 /LPF
IMM GRANULOCYTES NFR BLD AUTO: 0.3 %
KETONES URINE: NEGATIVE
LDLC SERPL CALC-MCNC: 165 MG/DL
LEUKOCYTE ESTERASE URINE: NEGATIVE
LYMPHOCYTES # BLD AUTO: 2.32 K/UL
LYMPHOCYTES NFR BLD AUTO: 34.4 %
MAN DIFF?: NORMAL
MCHC RBC-ENTMCNC: 23.6 PG
MCHC RBC-ENTMCNC: 29.6 GM/DL
MCV RBC AUTO: 79.8 FL
MICROSCOPIC-UA: NORMAL
MONOCYTES # BLD AUTO: 0.34 K/UL
MONOCYTES NFR BLD AUTO: 5 %
NEUTROPHILS # BLD AUTO: 3.84 K/UL
NEUTROPHILS NFR BLD AUTO: 57 %
NITRITE URINE: NEGATIVE
PH URINE: 6
PLATELET # BLD AUTO: 316 K/UL
POTASSIUM SERPL-SCNC: 4.6 MMOL/L
PROT SERPL-MCNC: 6.6 G/DL
PROTEIN URINE: ABNORMAL
PSA SERPL-MCNC: 0.34 NG/ML
RBC # BLD: 5.29 M/UL
RBC # FLD: 18.8 %
RED BLOOD CELLS URINE: 1 /HPF
SODIUM SERPL-SCNC: 141 MMOL/L
SPECIFIC GRAVITY URINE: 1.02
SQUAMOUS EPITHELIAL CELLS: 0 /HPF
TRIGL SERPL-MCNC: 155 MG/DL
TSH SERPL-ACNC: 2.02 UIU/ML
UROBILINOGEN URINE: NORMAL
WBC # FLD AUTO: 6.74 K/UL
WHITE BLOOD CELLS URINE: 1 /HPF

## 2020-10-02 PROCEDURE — 99213 OFFICE O/P EST LOW 20 MIN: CPT | Mod: 25

## 2020-10-02 PROCEDURE — G0008: CPT

## 2020-10-02 PROCEDURE — 90662 IIV NO PRSV INCREASED AG IM: CPT

## 2020-10-05 NOTE — HISTORY OF PRESENT ILLNESS
[FreeTextEntry1] : HLD prediabetes ADHD  [de-identified] : Pt here for follow up blood tests results discussed with patient\par \par Pt seeing therapist twice a week and doing marriage counselor together\par --wife is having problems of her own\par \par Denies SI , depression ,feeling down depressed\par \par
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
## 2020-10-20 RX ORDER — DEXTROAMPHETAMINE SULFATE, DEXTROAMPHETAMINE SACCHARATE, AMPHETAMINE SULFATE AND AMPHETAMINE ASPARTATE 7.5; 7.5; 7.5; 7.5 MG/1; MG/1; MG/1; MG/1
30 CAPSULE, EXTENDED RELEASE ORAL TWICE DAILY
Qty: 60 | Refills: 0 | Status: DISCONTINUED | COMMUNITY
Start: 2020-03-19 | End: 2020-10-20

## 2020-11-04 ENCOUNTER — RX RENEWAL (OUTPATIENT)
Age: 60
End: 2020-11-04

## 2020-11-10 ENCOUNTER — APPOINTMENT (OUTPATIENT)
Dept: INTERNAL MEDICINE | Facility: CLINIC | Age: 60
End: 2020-11-10
Payer: COMMERCIAL

## 2020-11-10 VITALS
HEIGHT: 70 IN | RESPIRATION RATE: 18 BRPM | WEIGHT: 183 LBS | HEART RATE: 74 BPM | TEMPERATURE: 97.2 F | OXYGEN SATURATION: 98 % | BODY MASS INDEX: 26.2 KG/M2 | DIASTOLIC BLOOD PRESSURE: 64 MMHG | SYSTOLIC BLOOD PRESSURE: 120 MMHG

## 2020-11-10 PROCEDURE — 99213 OFFICE O/P EST LOW 20 MIN: CPT

## 2020-11-10 PROCEDURE — 99072 ADDL SUPL MATRL&STAF TM PHE: CPT
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
## 2020-11-12 ENCOUNTER — APPOINTMENT (OUTPATIENT)
Dept: CARDIOLOGY | Facility: CLINIC | Age: 60
End: 2020-11-12

## 2020-11-16 ENCOUNTER — APPOINTMENT (OUTPATIENT)
Dept: PSYCHIATRY | Facility: CLINIC | Age: 60
End: 2020-11-16
Payer: COMMERCIAL

## 2020-11-16 DIAGNOSIS — F90.9 ATTENTION-DEFICIT HYPERACTIVITY DISORDER, UNSPECIFIED TYPE: ICD-10-CM

## 2020-11-16 PROCEDURE — 99215 OFFICE O/P EST HI 40 MIN: CPT

## 2020-12-21 PROBLEM — Z12.11 ENCOUNTER FOR SCREENING COLONOSCOPY: Status: RESOLVED | Noted: 2019-07-15 | Resolved: 2020-12-21

## 2020-12-21 PROBLEM — Z87.09 HISTORY OF INFLUENZA: Status: RESOLVED | Noted: 2018-03-07 | Resolved: 2020-12-21

## 2021-01-04 ENCOUNTER — RX RENEWAL (OUTPATIENT)
Age: 61
End: 2021-01-04

## 2021-03-27 ENCOUNTER — TRANSCRIPTION ENCOUNTER (OUTPATIENT)
Age: 61
End: 2021-03-27

## 2021-04-08 ENCOUNTER — APPOINTMENT (OUTPATIENT)
Dept: INTERNAL MEDICINE | Facility: CLINIC | Age: 61
End: 2021-04-08
Payer: COMMERCIAL

## 2021-04-08 VITALS
OXYGEN SATURATION: 99 % | WEIGHT: 195 LBS | SYSTOLIC BLOOD PRESSURE: 116 MMHG | DIASTOLIC BLOOD PRESSURE: 66 MMHG | BODY MASS INDEX: 27.92 KG/M2 | HEART RATE: 68 BPM | HEIGHT: 70 IN | TEMPERATURE: 97.6 F | RESPIRATION RATE: 18 BRPM

## 2021-04-08 DIAGNOSIS — N40.1 BENIGN PROSTATIC HYPERPLASIA WITH LOWER URINARY TRACT SYMPMS: ICD-10-CM

## 2021-04-08 DIAGNOSIS — N13.8 BENIGN PROSTATIC HYPERPLASIA WITH LOWER URINARY TRACT SYMPMS: ICD-10-CM

## 2021-04-08 DIAGNOSIS — Z88.9 ALLERGY STATUS TO UNSPECIFIED DRUGS, MEDICAMENTS AND BIOLOGICAL SUBSTANCES: ICD-10-CM

## 2021-04-08 DIAGNOSIS — Z86.59 PERSONAL HISTORY OF OTHER MENTAL AND BEHAVIORAL DISORDERS: ICD-10-CM

## 2021-04-08 DIAGNOSIS — Z87.898 PERSONAL HISTORY OF OTHER SPECIFIED CONDITIONS: ICD-10-CM

## 2021-04-08 DIAGNOSIS — Z86.018 PERSONAL HISTORY OF OTHER BENIGN NEOPLASM: ICD-10-CM

## 2021-04-08 PROCEDURE — 99072 ADDL SUPL MATRL&STAF TM PHE: CPT

## 2021-04-08 PROCEDURE — 99214 OFFICE O/P EST MOD 30 MIN: CPT

## 2021-04-08 RX ORDER — TAMSULOSIN HYDROCHLORIDE 0.4 MG/1
0.4 CAPSULE ORAL
Qty: 30 | Refills: 1 | Status: DISCONTINUED | COMMUNITY
Start: 2019-02-19 | End: 2021-04-08

## 2021-04-08 RX ORDER — AMOXICILLIN 500 MG/1
500 CAPSULE ORAL
Qty: 12 | Refills: 0 | Status: DISCONTINUED | COMMUNITY
Start: 2019-11-05 | End: 2021-04-08

## 2021-04-08 RX ORDER — SILDENAFIL 100 MG/1
100 TABLET, FILM COATED ORAL
Qty: 20 | Refills: 0 | Status: DISCONTINUED | COMMUNITY
Start: 2018-10-18 | End: 2021-04-08

## 2021-04-08 RX ORDER — VENLAFAXINE HYDROCHLORIDE 75 MG/1
75 CAPSULE, EXTENDED RELEASE ORAL
Qty: 90 | Refills: 3 | Status: DISCONTINUED | COMMUNITY
Start: 2018-03-08 | End: 2021-04-08

## 2021-04-08 NOTE — HISTORY OF PRESENT ILLNESS
[FreeTextEntry1] : here to go over his conditions / health [de-identified] : Not on BPH meds no urinary symptoms and was worse when tried flomax\par Concerned about valve reviewed echo - only MINIMAL Mitral regurgitation \par -pt without symtpoms\par - reassured this is common and benign\par RBBB\par - asymptomatic, common, and benign\par Pt has hx fatty liver\par -no symptoms , no RUQ pain\par - will repeat US\par - last LFTs were normal but will repeat\par \par Pt still has difficulties with attention (ADHD) but is off adderall, and tried wellbutrin no relief \par \par (Dr Eduardo)\par

## 2021-04-16 ENCOUNTER — APPOINTMENT (OUTPATIENT)
Dept: INTERNAL MEDICINE | Facility: CLINIC | Age: 61
End: 2021-04-16

## 2021-04-19 ENCOUNTER — RX RENEWAL (OUTPATIENT)
Age: 61
End: 2021-04-19

## 2021-04-26 ENCOUNTER — APPOINTMENT (OUTPATIENT)
Dept: INTERNAL MEDICINE | Facility: CLINIC | Age: 61
End: 2021-04-26
Payer: COMMERCIAL

## 2021-04-26 VITALS
SYSTOLIC BLOOD PRESSURE: 118 MMHG | RESPIRATION RATE: 18 BRPM | OXYGEN SATURATION: 99 % | HEART RATE: 66 BPM | DIASTOLIC BLOOD PRESSURE: 70 MMHG | WEIGHT: 187 LBS | HEIGHT: 70 IN | TEMPERATURE: 98.6 F | BODY MASS INDEX: 26.77 KG/M2

## 2021-04-26 DIAGNOSIS — F41.1 GENERALIZED ANXIETY DISORDER: ICD-10-CM

## 2021-04-26 PROCEDURE — 99072 ADDL SUPL MATRL&STAF TM PHE: CPT

## 2021-04-26 PROCEDURE — 99213 OFFICE O/P EST LOW 20 MIN: CPT

## 2021-04-26 NOTE — HISTORY OF PRESENT ILLNESS
[FreeTextEntry1] : follow up medication management [de-identified] : Irritability and ADHD \par Dr Baumann  tried wellbutrin some relief 2 weeks then stopped\par \par \par

## 2021-06-02 NOTE — REVIEW OF SYSTEMS
Pt. Here for treatment, Pt. Brought to treatment suite via wheel chair, pt. denies any questions or concerns. Right upper arm mediport accessed without difficulty, good blood return noted. Lab work drawn as ordered. Lab work reviewed with Dr. Kim Garcia, Dr. Kim Garcia wants to hold treatment this week, and decrease dose, and ordered 1 unit of blood. Pt. And pharmacy notified. Pt. Scheduled for blood tomorrow morning. Mediport left accessed, flushed with normal saline and alcohol cap applied. Pt. Assisted to car via wheelchair, daughter her to take pt. Home. Patient's status assessed and documented appropriately. All labs and required results were also reviewed today. Treatment parameters have been reviewed. Today's treatment has been approved by the provider. Treatment orders and medication sequencing (when applicable) was verified by 2 registered nurses. The treatment plan was confirmed with the patient prior to administration, and the patient understands the need to report any treatment-related symptoms. Prior to administration, when applicable, the following 8 elements of medication administration were reviewed with 2nd Registered Nurse prior to dosing: drug name, drug dose, infusion volume when prepared in a syringe, rate of administration, expiration dates and/or times, appearance and integrity of drug(s), and rate of pump for infusion. The 5 rights of medication administration have been verified.
[Negative] : Heme/Lymph

## 2021-07-21 ENCOUNTER — RX RENEWAL (OUTPATIENT)
Age: 61
End: 2021-07-21

## 2022-01-07 ENCOUNTER — TRANSCRIPTION ENCOUNTER (OUTPATIENT)
Age: 62
End: 2022-01-07

## 2022-03-14 NOTE — ASSESSMENT
Consult received. Well known to our service with history of chronic afib, DVT/PE, venous stasis ulcers, venous insufficiency s/p EVLT s/p stenting for May Thurner. Seen earlier today in cardiology clinic with HR 120s afib- Toprol XL stopped during last hospital admission for unknown reasons. Toprol XL resumed at 50mg daily with instructions to increase in one week- asymptomatic in the office. Presented to the ER with dizziness after wound care appt. HR noted to be afib with RVR and given dose of IV Amiodarone with reports of unresponsiveness but palpable pulse- no EKG available in Epic currently. SBP 110s-130s per Epic. Recommend resumption of Metoprolol and would recommend starting Metoprolol Tartrate 25mg po BID with up titration as needed with plans to convert to Toprol XL at later time. History of chronic afib therefore ultimate goal is rate control rather than rhythm control. Full note to follow    [FreeTextEntry1] : [pt advised to follow up with his psychiatrist

## 2022-03-29 ENCOUNTER — RX RENEWAL (OUTPATIENT)
Age: 62
End: 2022-03-29

## 2022-07-07 RX ORDER — ZOSTER VACCINE RECOMBINANT, ADJUVANTED 50 MCG/0.5
50 KIT INTRAMUSCULAR
Qty: 1 | Refills: 1 | Status: DISCONTINUED | OUTPATIENT
Start: 2018-09-13 | End: 2022-07-07

## 2022-07-07 RX ORDER — ZOSTER VACCINE RECOMBINANT, ADJUVANTED 50 MCG/0.5
50 KIT INTRAMUSCULAR
Qty: 1 | Refills: 0 | Status: DISCONTINUED | OUTPATIENT
Start: 2018-06-21 | End: 2022-07-07

## 2022-08-31 ENCOUNTER — APPOINTMENT (OUTPATIENT)
Dept: INTERNAL MEDICINE | Facility: CLINIC | Age: 62
End: 2022-08-31

## 2022-08-31 ENCOUNTER — NON-APPOINTMENT (OUTPATIENT)
Age: 62
End: 2022-08-31

## 2022-08-31 VITALS
HEART RATE: 66 BPM | RESPIRATION RATE: 18 BRPM | TEMPERATURE: 98 F | OXYGEN SATURATION: 97 % | BODY MASS INDEX: 26.2 KG/M2 | WEIGHT: 183 LBS | HEIGHT: 70 IN | SYSTOLIC BLOOD PRESSURE: 120 MMHG | DIASTOLIC BLOOD PRESSURE: 62 MMHG

## 2022-08-31 DIAGNOSIS — Z00.00 ENCOUNTER FOR GENERAL ADULT MEDICAL EXAMINATION W/OUT ABNORMAL FINDINGS: ICD-10-CM

## 2022-08-31 PROCEDURE — 99396 PREV VISIT EST AGE 40-64: CPT | Mod: 25

## 2022-08-31 PROCEDURE — 93000 ELECTROCARDIOGRAM COMPLETE: CPT | Mod: 59

## 2022-08-31 NOTE — HEALTH RISK ASSESSMENT
[Excellent] : ~his/her~  mood as  excellent [Never] : Never [1 or 2 (0 pts)] : 1 or 2 (0 points) [Never (0 pts)] : Never (0 points) [No] : In the past 12 months have you used drugs other than those required for medical reasons? No [No falls in past year] : Patient reported no falls in the past year [0] : 2) Feeling down, depressed, or hopeless: Not at all (0) [PHQ-2 Negative - No further assessment needed] : PHQ-2 Negative - No further assessment needed [Patient reported colonoscopy was normal] : Patient reported colonoscopy was normal [HIV test declined] : HIV test declined [Hepatitis C test declined] : Hepatitis C test declined [None] : None [With Family] : lives with family [Employed] : employed [] :  [Sexually Active] : sexually active [Feels Safe at Home] : Feels safe at home [Fully functional (bathing, dressing, toileting, transferring, walking, feeding)] : Fully functional (bathing, dressing, toileting, transferring, walking, feeding) [Fully functional (using the telephone, shopping, preparing meals, housekeeping, doing laundry, using] : Fully functional and needs no help or supervision to perform IADLs (using the telephone, shopping, preparing meals, housekeeping, doing laundry, using transportation, managing medications and managing finances) [Audit-CScore] : 0 [de-identified] : every day [de-identified] : good [[id number]] : 0 [Change in mental status noted] : No change in mental status noted [Language] : denies difficulty with language [Behavior] : denies difficulty with behavior [Learning/Retaining New Information] : denies difficulty learning/retaining new information [Handling Complex Tasks] : denies difficulty handling complex tasks [Reasoning] : denies difficulty with reasoning [Spatial Ability and Orientation] : denies difficulty with spatial ability and orientation [Reports changes in hearing] : Reports no changes in hearing [Reports changes in vision] : Reports no changes in vision [Reports changes in dental health] : Reports no changes in dental health [ColonoscopyDate] : 2019

## 2023-03-10 ENCOUNTER — APPOINTMENT (OUTPATIENT)
Dept: RADIOLOGY | Facility: HOSPITAL | Age: 63
End: 2023-03-10
Payer: COMMERCIAL

## 2023-03-10 ENCOUNTER — OUTPATIENT (OUTPATIENT)
Dept: OUTPATIENT SERVICES | Facility: HOSPITAL | Age: 63
LOS: 1 days | End: 2023-03-10
Payer: COMMERCIAL

## 2023-03-10 DIAGNOSIS — M25.561 PAIN IN RIGHT KNEE: ICD-10-CM

## 2023-03-10 DIAGNOSIS — Z96.649 PRESENCE OF UNSPECIFIED ARTIFICIAL HIP JOINT: Chronic | ICD-10-CM

## 2023-03-10 DIAGNOSIS — G89.29 OTHER CHRONIC PAIN: ICD-10-CM

## 2023-03-10 PROCEDURE — 73562 X-RAY EXAM OF KNEE 3: CPT | Mod: 26,RT

## 2023-03-10 PROCEDURE — 73562 X-RAY EXAM OF KNEE 3: CPT

## 2023-03-28 NOTE — ED ADULT TRIAGE NOTE - BP NONINVASIVE DIASTOLIC (MM HG)
83 Patient is an 81-year-old male past history of diabetes, hypertension, hyperlipidemia, bladder cancer status post urostomy, ureteral cancer in remission here for evaluation of generalized malaise fatigue x2 days with associated right flank pain and rigors.  Patient denies nausea, vomiting, diarrhea, chest pain, shortness of breath

## 2023-08-30 ENCOUNTER — RESULT REVIEW (OUTPATIENT)
Age: 63
End: 2023-08-30

## 2023-08-30 ENCOUNTER — APPOINTMENT (OUTPATIENT)
Dept: INTERNAL MEDICINE | Facility: CLINIC | Age: 63
End: 2023-08-30
Payer: COMMERCIAL

## 2023-08-30 VITALS
HEIGHT: 70 IN | RESPIRATION RATE: 16 BRPM | DIASTOLIC BLOOD PRESSURE: 78 MMHG | TEMPERATURE: 98 F | OXYGEN SATURATION: 97 % | SYSTOLIC BLOOD PRESSURE: 122 MMHG | BODY MASS INDEX: 28.77 KG/M2 | HEART RATE: 61 BPM | WEIGHT: 201 LBS

## 2023-08-30 LAB
25(OH)D3 SERPL-MCNC: 35.5 NG/ML
ALBUMIN SERPL ELPH-MCNC: 4.3 G/DL
ALP BLD-CCNC: 90 U/L
ALT SERPL-CCNC: 17 U/L
ANION GAP SERPL CALC-SCNC: 12 MMOL/L
APPEARANCE: CLEAR
AST SERPL-CCNC: 17 U/L
BACTERIA: NEGATIVE /HPF
BILIRUB SERPL-MCNC: 0.2 MG/DL
BILIRUBIN URINE: NEGATIVE
BLOOD URINE: NEGATIVE
BUN SERPL-MCNC: 28 MG/DL
CALCIUM SERPL-MCNC: 9.2 MG/DL
CAST: 0 /LPF
CHLORIDE SERPL-SCNC: 105 MMOL/L
CHOLEST SERPL-MCNC: 238 MG/DL
CO2 SERPL-SCNC: 21 MMOL/L
COLOR: YELLOW
CREAT SERPL-MCNC: 1.05 MG/DL
EGFR: 81 ML/MIN/1.73M2
EPITHELIAL CELLS: 0 /HPF
ESTIMATED AVERAGE GLUCOSE: 120 MG/DL
GLUCOSE QUALITATIVE U: NEGATIVE MG/DL
GLUCOSE SERPL-MCNC: 105 MG/DL
HBA1C MFR BLD HPLC: 5.8 %
HDLC SERPL-MCNC: 42 MG/DL
KETONES URINE: NEGATIVE MG/DL
LDLC SERPL CALC-MCNC: 173 MG/DL
LEUKOCYTE ESTERASE URINE: NEGATIVE
MICROSCOPIC-UA: NORMAL
NITRITE URINE: NEGATIVE
NONHDLC SERPL-MCNC: 196 MG/DL
PH URINE: 5.5
POTASSIUM SERPL-SCNC: 4.7 MMOL/L
PROT SERPL-MCNC: 6.2 G/DL
PROTEIN URINE: NEGATIVE MG/DL
PSA FREE FLD-MCNC: 24 %
PSA FREE SERPL-MCNC: 0.11 NG/ML
PSA SERPL-MCNC: 0.46 NG/ML
RED BLOOD CELLS URINE: 1 /HPF
SODIUM SERPL-SCNC: 138 MMOL/L
SPECIFIC GRAVITY URINE: 1.02
TRIGL SERPL-MCNC: 126 MG/DL
TSH SERPL-ACNC: 2.33 UIU/ML
UROBILINOGEN URINE: 0.2 MG/DL
WHITE BLOOD CELLS URINE: 0 /HPF

## 2023-08-30 PROCEDURE — 99214 OFFICE O/P EST MOD 30 MIN: CPT

## 2023-08-30 NOTE — HISTORY OF PRESENT ILLNESS
[FreeTextEntry1] : RO preDM [de-identified] : states eats healthy balanced diet rare red meat occ eggs  gained 20 lbs past 18 mos

## 2023-09-13 ENCOUNTER — APPOINTMENT (OUTPATIENT)
Dept: ULTRASOUND IMAGING | Facility: HOSPITAL | Age: 63
End: 2023-09-13
Payer: COMMERCIAL

## 2023-09-13 ENCOUNTER — OUTPATIENT (OUTPATIENT)
Dept: OUTPATIENT SERVICES | Facility: HOSPITAL | Age: 63
LOS: 1 days | End: 2023-09-13
Payer: COMMERCIAL

## 2023-09-13 DIAGNOSIS — K76.0 FATTY (CHANGE OF) LIVER, NOT ELSEWHERE CLASSIFIED: ICD-10-CM

## 2023-09-13 DIAGNOSIS — Z96.649 PRESENCE OF UNSPECIFIED ARTIFICIAL HIP JOINT: Chronic | ICD-10-CM

## 2023-09-13 PROCEDURE — 76705 ECHO EXAM OF ABDOMEN: CPT | Mod: 26

## 2023-09-13 PROCEDURE — 76705 ECHO EXAM OF ABDOMEN: CPT

## 2023-09-19 ENCOUNTER — APPOINTMENT (OUTPATIENT)
Dept: CT IMAGING | Facility: CLINIC | Age: 63
End: 2023-09-19
Payer: COMMERCIAL

## 2023-09-19 PROCEDURE — 75571 CT HRT W/O DYE W/CA TEST: CPT

## 2023-09-27 ENCOUNTER — TRANSCRIPTION ENCOUNTER (OUTPATIENT)
Age: 63
End: 2023-09-27

## 2023-09-28 ENCOUNTER — TRANSCRIPTION ENCOUNTER (OUTPATIENT)
Age: 63
End: 2023-09-28

## 2023-10-06 ENCOUNTER — APPOINTMENT (OUTPATIENT)
Dept: INTERNAL MEDICINE | Facility: CLINIC | Age: 63
End: 2023-10-06

## 2023-10-06 VITALS
BODY MASS INDEX: 28.2 KG/M2 | WEIGHT: 197 LBS | HEIGHT: 70 IN | DIASTOLIC BLOOD PRESSURE: 74 MMHG | OXYGEN SATURATION: 96 % | RESPIRATION RATE: 14 BRPM | HEART RATE: 55 BPM | TEMPERATURE: 97.8 F | SYSTOLIC BLOOD PRESSURE: 104 MMHG

## 2023-12-04 LAB
ALBUMIN SERPL ELPH-MCNC: 4.6 G/DL
ALP BLD-CCNC: 83 U/L
ALT SERPL-CCNC: 21 U/L
ANION GAP SERPL CALC-SCNC: 11 MMOL/L
APPEARANCE: CLEAR
AST SERPL-CCNC: 21 U/L
BACTERIA: NEGATIVE /HPF
BILIRUB SERPL-MCNC: 0.8 MG/DL
BILIRUBIN URINE: NEGATIVE
BLOOD URINE: NEGATIVE
BUN SERPL-MCNC: 24 MG/DL
CALCIUM SERPL-MCNC: 9.8 MG/DL
CAST: 0 /LPF
CHLORIDE SERPL-SCNC: 103 MMOL/L
CHOLEST SERPL-MCNC: 177 MG/DL
CO2 SERPL-SCNC: 25 MMOL/L
COLOR: YELLOW
CREAT SERPL-MCNC: 0.98 MG/DL
CREAT SPEC-SCNC: 79 MG/DL
EGFR: 88 ML/MIN/1.73M2
EPITHELIAL CELLS: 0 /HPF
ESTIMATED AVERAGE GLUCOSE: 117 MG/DL
GLUCOSE QUALITATIVE U: NEGATIVE MG/DL
GLUCOSE SERPL-MCNC: 93 MG/DL
HBA1C MFR BLD HPLC: 5.7 %
HDLC SERPL-MCNC: 55 MG/DL
KETONES URINE: NEGATIVE MG/DL
LDLC SERPL CALC-MCNC: 111 MG/DL
LEUKOCYTE ESTERASE URINE: NEGATIVE
MICROALBUMIN 24H UR DL<=1MG/L-MCNC: <1.2 MG/DL
MICROALBUMIN/CREAT 24H UR-RTO: NORMAL MG/G
MICROSCOPIC-UA: NORMAL
NITRITE URINE: NEGATIVE
NONHDLC SERPL-MCNC: 122 MG/DL
PH URINE: 6
POTASSIUM SERPL-SCNC: 5 MMOL/L
PROT SERPL-MCNC: 7.1 G/DL
PROTEIN URINE: NEGATIVE MG/DL
RED BLOOD CELLS URINE: 0 /HPF
SODIUM SERPL-SCNC: 138 MMOL/L
SPECIFIC GRAVITY URINE: 1.01
TRIGL SERPL-MCNC: 55 MG/DL
TSH SERPL-ACNC: 1.4 UIU/ML
UROBILINOGEN URINE: 0.2 MG/DL
WHITE BLOOD CELLS URINE: 0 /HPF

## 2023-12-05 ENCOUNTER — NON-APPOINTMENT (OUTPATIENT)
Age: 63
End: 2023-12-05

## 2023-12-05 ENCOUNTER — APPOINTMENT (OUTPATIENT)
Dept: INTERNAL MEDICINE | Facility: CLINIC | Age: 63
End: 2023-12-05
Payer: COMMERCIAL

## 2023-12-05 VITALS
SYSTOLIC BLOOD PRESSURE: 118 MMHG | HEIGHT: 70 IN | OXYGEN SATURATION: 97 % | WEIGHT: 196 LBS | DIASTOLIC BLOOD PRESSURE: 78 MMHG | HEART RATE: 68 BPM | TEMPERATURE: 99.1 F | RESPIRATION RATE: 14 BRPM | BODY MASS INDEX: 28.06 KG/M2

## 2023-12-05 DIAGNOSIS — E66.3 OVERWEIGHT: ICD-10-CM

## 2023-12-05 DIAGNOSIS — N52.8 OTHER MALE ERECTILE DYSFUNCTION: ICD-10-CM

## 2023-12-05 PROCEDURE — 99396 PREV VISIT EST AGE 40-64: CPT

## 2023-12-05 RX ORDER — TADALAFIL 5 MG/1
5 TABLET ORAL
Qty: 90 | Refills: 3 | Status: ACTIVE | COMMUNITY
Start: 2017-06-01 | End: 1900-01-01

## 2023-12-05 RX ORDER — BUPROPION HYDROCHLORIDE 150 MG/1
150 TABLET, EXTENDED RELEASE ORAL DAILY
Qty: 90 | Refills: 3 | Status: COMPLETED | COMMUNITY
Start: 2021-04-26 | End: 2023-12-05

## 2024-01-29 RX ORDER — TIRZEPATIDE 2.5 MG/.5ML
2.5 INJECTION, SOLUTION SUBCUTANEOUS
Qty: 4 | Refills: 2 | Status: DISCONTINUED | COMMUNITY
Start: 2023-12-05 | End: 2024-01-29

## 2024-02-21 NOTE — PHYSICAL EXAM
[Well Nourished] : well nourished [Well Developed] : well developed [Well-Appearing] : well-appearing [Normal Sclera/Conjunctiva] : normal sclera/conjunctiva [PERRL] : pupils equal round and reactive to light [EOMI] : extraocular movements intact [Normal Outer Ear/Nose] : the outer ears and nose were normal in appearance [Normal Oropharynx] : the oropharynx was normal [No JVD] : no jugular venous distention [No Lymphadenopathy] : no lymphadenopathy [Supple] : supple [Thyroid Normal, No Nodules] : the thyroid was normal and there were no nodules present [No Respiratory Distress] : no respiratory distress  [No Accessory Muscle Use] : no accessory muscle use [Clear to Auscultation] : lungs were clear to auscultation bilaterally [Normal Rate] : normal rate  [Regular Rhythm] : with a regular rhythm [Normal S1, S2] : normal S1 and S2 [No Murmur] : no murmur heard [No Carotid Bruits] : no carotid bruits [No Abdominal Bruit] : a ~M bruit was not heard ~T in the abdomen [No Varicosities] : no varicosities [Pedal Pulses Present] : the pedal pulses are present normal (ped)... [No Edema] : there was no peripheral edema [No Palpable Aorta] : no palpable aorta [No Extremity Clubbing/Cyanosis] : no extremity clubbing/cyanosis [Soft] : abdomen soft [Non Tender] : non-tender [Non-distended] : non-distended [No Masses] : no abdominal mass palpated [No HSM] : no HSM [Normal Bowel Sounds] : normal bowel sounds [Normal Posterior Cervical Nodes] : no posterior cervical lymphadenopathy [Normal Anterior Cervical Nodes] : no anterior cervical lymphadenopathy [No CVA Tenderness] : no CVA  tenderness [No Spinal Tenderness] : no spinal tenderness [No Joint Swelling] : no joint swelling [Grossly Normal Strength/Tone] : grossly normal strength/tone [No Rash] : no rash [Coordination Grossly Intact] : coordination grossly intact [No Focal Deficits] : no focal deficits [Normal Gait] : normal gait [Normal Affect] : the affect was normal [Normal Insight/Judgement] : insight and judgment were intact

## 2024-03-04 ENCOUNTER — APPOINTMENT (OUTPATIENT)
Dept: INTERNAL MEDICINE | Facility: CLINIC | Age: 64
End: 2024-03-04
Payer: COMMERCIAL

## 2024-03-04 VITALS
OXYGEN SATURATION: 97 % | RESPIRATION RATE: 16 BRPM | HEIGHT: 70 IN | TEMPERATURE: 97.3 F | BODY MASS INDEX: 26.63 KG/M2 | SYSTOLIC BLOOD PRESSURE: 116 MMHG | WEIGHT: 186 LBS | DIASTOLIC BLOOD PRESSURE: 66 MMHG | HEART RATE: 58 BPM

## 2024-03-04 DIAGNOSIS — R73.03 PREDIABETES.: ICD-10-CM

## 2024-03-04 DIAGNOSIS — E66.9 OVERWEIGHT: ICD-10-CM

## 2024-03-04 DIAGNOSIS — E66.3 OVERWEIGHT: ICD-10-CM

## 2024-03-04 DIAGNOSIS — E78.5 HYPERLIPIDEMIA, UNSPECIFIED: ICD-10-CM

## 2024-03-04 DIAGNOSIS — K76.0 FATTY (CHANGE OF) LIVER, NOT ELSEWHERE CLASSIFIED: ICD-10-CM

## 2024-03-04 PROCEDURE — 99214 OFFICE O/P EST MOD 30 MIN: CPT

## 2024-03-04 PROCEDURE — G2211 COMPLEX E/M VISIT ADD ON: CPT

## 2024-03-04 RX ORDER — ROSUVASTATIN CALCIUM 10 MG/1
10 TABLET, FILM COATED ORAL
Qty: 90 | Refills: 3 | Status: ACTIVE | COMMUNITY
Start: 2023-08-30

## 2024-03-04 NOTE — HISTORY OF PRESENT ILLNESS
[FreeTextEntry1] : obesity preDM follow up  [de-identified] : doing well on zepbound lost 10lbs coudln't lose weight before was over 200 lbs last year  labs reviewed doing well a1c now back in normal range

## 2024-03-12 LAB
25(OH)D3 SERPL-MCNC: 30 NG/ML
ALBUMIN SERPL ELPH-MCNC: 4.5 G/DL
ALP BLD-CCNC: 58 U/L
ALT SERPL-CCNC: 15 U/L
ANION GAP SERPL CALC-SCNC: 12 MMOL/L
APPEARANCE: CLEAR
AST SERPL-CCNC: 18 U/L
BACTERIA: NEGATIVE /HPF
BASOPHILS # BLD AUTO: 0.05 K/UL
BASOPHILS NFR BLD AUTO: 0.8 %
BILIRUB SERPL-MCNC: 0.5 MG/DL
BILIRUBIN URINE: NEGATIVE
BLOOD URINE: NEGATIVE
BUN SERPL-MCNC: 24 MG/DL
CALCIUM SERPL-MCNC: 9.5 MG/DL
CAST: 0 /LPF
CHLORIDE SERPL-SCNC: 107 MMOL/L
CHOLEST SERPL-MCNC: 124 MG/DL
CO2 SERPL-SCNC: 23 MMOL/L
COLOR: YELLOW
CREAT SERPL-MCNC: 1.15 MG/DL
EGFR: 72 ML/MIN/1.73M2
EOSINOPHIL # BLD AUTO: 0.14 K/UL
EOSINOPHIL NFR BLD AUTO: 2.2 %
EPITHELIAL CELLS: 0 /HPF
ESTIMATED AVERAGE GLUCOSE: 114 MG/DL
GLUCOSE QUALITATIVE U: NEGATIVE MG/DL
GLUCOSE SERPL-MCNC: 89 MG/DL
HBA1C MFR BLD HPLC: 5.6 %
HCT VFR BLD CALC: 41.9 %
HDLC SERPL-MCNC: 51 MG/DL
HGB BLD-MCNC: 13 G/DL
IMM GRANULOCYTES NFR BLD AUTO: 0.2 %
KETONES URINE: NEGATIVE MG/DL
LDLC SERPL CALC-MCNC: 63 MG/DL
LEUKOCYTE ESTERASE URINE: NEGATIVE
LYMPHOCYTES # BLD AUTO: 2.41 K/UL
LYMPHOCYTES NFR BLD AUTO: 37.8 %
MAN DIFF?: NORMAL
MCHC RBC-ENTMCNC: 25.5 PG
MCHC RBC-ENTMCNC: 31 GM/DL
MCV RBC AUTO: 82.2 FL
MICROSCOPIC-UA: NORMAL
MONOCYTES # BLD AUTO: 0.52 K/UL
MONOCYTES NFR BLD AUTO: 8.2 %
NEUTROPHILS # BLD AUTO: 3.24 K/UL
NEUTROPHILS NFR BLD AUTO: 50.8 %
NITRITE URINE: NEGATIVE
NONHDLC SERPL-MCNC: 73 MG/DL
PH URINE: 6
PLATELET # BLD AUTO: 272 K/UL
POTASSIUM SERPL-SCNC: 4.7 MMOL/L
PROT SERPL-MCNC: 6.6 G/DL
PROTEIN URINE: NEGATIVE MG/DL
PSA SERPL-MCNC: 0.37 NG/ML
RBC # BLD: 5.1 M/UL
RBC # FLD: 14.6 %
RED BLOOD CELLS URINE: 1 /HPF
SODIUM SERPL-SCNC: 142 MMOL/L
SPECIFIC GRAVITY URINE: 1.02
TRIGL SERPL-MCNC: 42 MG/DL
TSH SERPL-ACNC: 1.27 UIU/ML
UROBILINOGEN URINE: 0.2 MG/DL
WBC # FLD AUTO: 6.37 K/UL
WHITE BLOOD CELLS URINE: 0 /HPF

## 2024-03-19 RX ORDER — TIRZEPATIDE 5 MG/.5ML
5 INJECTION, SOLUTION SUBCUTANEOUS
Qty: 1 | Refills: 2 | Status: ACTIVE | COMMUNITY
Start: 2023-12-13 | End: 1900-01-01

## 2024-04-25 RX ORDER — SEMAGLUTIDE 1.7 MG/.75ML
1.7 INJECTION, SOLUTION SUBCUTANEOUS
Qty: 1 | Refills: 3 | Status: ACTIVE | COMMUNITY
Start: 2023-12-21 | End: 1900-01-01

## 2024-07-31 ENCOUNTER — EMERGENCY (EMERGENCY)
Facility: HOSPITAL | Age: 64
LOS: 1 days | Discharge: HOME | End: 2024-07-31
Attending: EMERGENCY MEDICINE | Admitting: EMERGENCY MEDICINE
Payer: COMMERCIAL

## 2024-07-31 VITALS
TEMPERATURE: 98 F | DIASTOLIC BLOOD PRESSURE: 82 MMHG | OXYGEN SATURATION: 99 % | RESPIRATION RATE: 18 BRPM | HEART RATE: 78 BPM | SYSTOLIC BLOOD PRESSURE: 138 MMHG

## 2024-07-31 VITALS
HEART RATE: 56 BPM | RESPIRATION RATE: 18 BRPM | TEMPERATURE: 97 F | SYSTOLIC BLOOD PRESSURE: 150 MMHG | WEIGHT: 169.98 LBS | OXYGEN SATURATION: 98 % | HEIGHT: 70 IN | DIASTOLIC BLOOD PRESSURE: 90 MMHG

## 2024-07-31 DIAGNOSIS — Z96.649 PRESENCE OF UNSPECIFIED ARTIFICIAL HIP JOINT: Chronic | ICD-10-CM

## 2024-07-31 LAB
ALBUMIN SERPL ELPH-MCNC: 3.7 G/DL — SIGNIFICANT CHANGE UP (ref 3.3–5)
ALP SERPL-CCNC: 65 U/L — SIGNIFICANT CHANGE UP (ref 40–120)
ALT FLD-CCNC: 15 U/L — SIGNIFICANT CHANGE UP (ref 10–45)
ANION GAP SERPL CALC-SCNC: 7 MMOL/L — SIGNIFICANT CHANGE UP (ref 5–17)
APPEARANCE UR: CLEAR — SIGNIFICANT CHANGE UP
APTT BLD: 34 SEC — SIGNIFICANT CHANGE UP (ref 24.5–35.6)
AST SERPL-CCNC: 15 U/L — SIGNIFICANT CHANGE UP (ref 10–40)
BASOPHILS # BLD AUTO: 0.03 K/UL — SIGNIFICANT CHANGE UP (ref 0–0.2)
BASOPHILS NFR BLD AUTO: 0.4 % — SIGNIFICANT CHANGE UP (ref 0–2)
BILIRUB SERPL-MCNC: 0.7 MG/DL — SIGNIFICANT CHANGE UP (ref 0.2–1.2)
BILIRUB UR-MCNC: NEGATIVE — SIGNIFICANT CHANGE UP
BUN SERPL-MCNC: 24 MG/DL — HIGH (ref 7–23)
CALCIUM SERPL-MCNC: 9.2 MG/DL — SIGNIFICANT CHANGE UP (ref 8.4–10.5)
CHLORIDE SERPL-SCNC: 102 MMOL/L — SIGNIFICANT CHANGE UP (ref 96–108)
CO2 SERPL-SCNC: 28 MMOL/L — SIGNIFICANT CHANGE UP (ref 22–31)
COLOR SPEC: YELLOW — SIGNIFICANT CHANGE UP
CREAT SERPL-MCNC: 0.95 MG/DL — SIGNIFICANT CHANGE UP (ref 0.5–1.3)
D DIMER BLD IA.RAPID-MCNC: <150 NG/ML DDU — SIGNIFICANT CHANGE UP
DIFF PNL FLD: NEGATIVE — SIGNIFICANT CHANGE UP
EGFR: 90 ML/MIN/1.73M2 — SIGNIFICANT CHANGE UP
EGFR: 90 ML/MIN/1.73M2 — SIGNIFICANT CHANGE UP
EOSINOPHIL # BLD AUTO: 0.04 K/UL — SIGNIFICANT CHANGE UP (ref 0–0.5)
EOSINOPHIL NFR BLD AUTO: 0.6 % — SIGNIFICANT CHANGE UP (ref 0–6)
GLUCOSE SERPL-MCNC: 108 MG/DL — HIGH (ref 70–99)
GLUCOSE UR QL: NEGATIVE MG/DL — SIGNIFICANT CHANGE UP
HCT VFR BLD CALC: 40 % — SIGNIFICANT CHANGE UP (ref 39–50)
HGB BLD-MCNC: 13.5 G/DL — SIGNIFICANT CHANGE UP (ref 13–17)
IMM GRANULOCYTES NFR BLD AUTO: 0.3 % — SIGNIFICANT CHANGE UP (ref 0–0.9)
INR BLD: 1.01 RATIO — SIGNIFICANT CHANGE UP (ref 0.85–1.18)
KETONES UR-MCNC: ABNORMAL MG/DL
LEUKOCYTE ESTERASE UR-ACNC: NEGATIVE — SIGNIFICANT CHANGE UP
LIDOCAIN IGE QN: 27 U/L — SIGNIFICANT CHANGE UP (ref 16–77)
LYMPHOCYTES # BLD AUTO: 1.7 K/UL — SIGNIFICANT CHANGE UP (ref 1–3.3)
LYMPHOCYTES # BLD AUTO: 24.3 % — SIGNIFICANT CHANGE UP (ref 13–44)
MCHC RBC-ENTMCNC: 28.2 PG — SIGNIFICANT CHANGE UP (ref 27–34)
MCHC RBC-ENTMCNC: 33.8 GM/DL — SIGNIFICANT CHANGE UP (ref 32–36)
MCV RBC AUTO: 83.5 FL — SIGNIFICANT CHANGE UP (ref 80–100)
MONOCYTES # BLD AUTO: 0.45 K/UL — SIGNIFICANT CHANGE UP (ref 0–0.9)
MONOCYTES NFR BLD AUTO: 6.4 % — SIGNIFICANT CHANGE UP (ref 2–14)
NEUTROPHILS # BLD AUTO: 4.77 K/UL — SIGNIFICANT CHANGE UP (ref 1.8–7.4)
NEUTROPHILS NFR BLD AUTO: 68 % — SIGNIFICANT CHANGE UP (ref 43–77)
NITRITE UR-MCNC: NEGATIVE — SIGNIFICANT CHANGE UP
NRBC # BLD: 0 /100 WBCS — SIGNIFICANT CHANGE UP (ref 0–0)
NRBC BLD-RTO: 0 /100 WBCS — SIGNIFICANT CHANGE UP (ref 0–0)
NT-PROBNP SERPL-SCNC: 45 PG/ML — SIGNIFICANT CHANGE UP (ref 0–300)
PH UR: 6 — SIGNIFICANT CHANGE UP (ref 5–8)
PLATELET # BLD AUTO: 227 K/UL — SIGNIFICANT CHANGE UP (ref 150–400)
POTASSIUM SERPL-MCNC: 4.3 MMOL/L — SIGNIFICANT CHANGE UP (ref 3.5–5.3)
POTASSIUM SERPL-SCNC: 4.3 MMOL/L — SIGNIFICANT CHANGE UP (ref 3.5–5.3)
PROT SERPL-MCNC: 6.6 G/DL — SIGNIFICANT CHANGE UP (ref 6–8.3)
PROT UR-MCNC: NEGATIVE MG/DL — SIGNIFICANT CHANGE UP
PROTHROM AB SERPL-ACNC: 11.5 SEC — SIGNIFICANT CHANGE UP (ref 9.5–13)
RBC # BLD: 4.79 M/UL — SIGNIFICANT CHANGE UP (ref 4.2–5.8)
RBC # FLD: 13.6 % — SIGNIFICANT CHANGE UP (ref 10.3–14.5)
SODIUM SERPL-SCNC: 137 MMOL/L — SIGNIFICANT CHANGE UP (ref 135–145)
SP GR SPEC: 1.05 — HIGH (ref 1–1.03)
TROPONIN I, HIGH SENSITIVITY RESULT: 6.7 NG/L — SIGNIFICANT CHANGE UP
UROBILINOGEN FLD QL: 0.2 MG/DL — SIGNIFICANT CHANGE UP (ref 0.2–1)
WBC # BLD: 7.01 K/UL — SIGNIFICANT CHANGE UP (ref 3.8–10.5)
WBC # FLD AUTO: 7.01 K/UL — SIGNIFICANT CHANGE UP (ref 3.8–10.5)

## 2024-07-31 PROCEDURE — 85025 COMPLETE CBC W/AUTO DIFF WBC: CPT

## 2024-07-31 PROCEDURE — 96376 TX/PRO/DX INJ SAME DRUG ADON: CPT | Mod: XU

## 2024-07-31 PROCEDURE — 93005 ELECTROCARDIOGRAM TRACING: CPT

## 2024-07-31 PROCEDURE — 74174 CTA ABD&PLVS W/CONTRAST: CPT | Mod: MC

## 2024-07-31 PROCEDURE — 85610 PROTHROMBIN TIME: CPT

## 2024-07-31 PROCEDURE — 81003 URINALYSIS AUTO W/O SCOPE: CPT

## 2024-07-31 PROCEDURE — 71275 CT ANGIOGRAPHY CHEST: CPT | Mod: 26,MC

## 2024-07-31 PROCEDURE — 99285 EMERGENCY DEPT VISIT HI MDM: CPT | Mod: 25

## 2024-07-31 PROCEDURE — 74174 CTA ABD&PLVS W/CONTRAST: CPT | Mod: 26,MC

## 2024-07-31 PROCEDURE — 84484 ASSAY OF TROPONIN QUANT: CPT

## 2024-07-31 PROCEDURE — 96365 THER/PROPH/DIAG IV INF INIT: CPT | Mod: XU

## 2024-07-31 PROCEDURE — 85379 FIBRIN DEGRADATION QUANT: CPT

## 2024-07-31 PROCEDURE — 71275 CT ANGIOGRAPHY CHEST: CPT | Mod: MC

## 2024-07-31 PROCEDURE — 85730 THROMBOPLASTIN TIME PARTIAL: CPT

## 2024-07-31 PROCEDURE — 93010 ELECTROCARDIOGRAM REPORT: CPT

## 2024-07-31 PROCEDURE — 99285 EMERGENCY DEPT VISIT HI MDM: CPT

## 2024-07-31 PROCEDURE — 83880 ASSAY OF NATRIURETIC PEPTIDE: CPT

## 2024-07-31 PROCEDURE — 80053 COMPREHEN METABOLIC PANEL: CPT

## 2024-07-31 PROCEDURE — 83690 ASSAY OF LIPASE: CPT

## 2024-07-31 PROCEDURE — 96375 TX/PRO/DX INJ NEW DRUG ADDON: CPT | Mod: XU

## 2024-07-31 PROCEDURE — 36415 COLL VENOUS BLD VENIPUNCTURE: CPT

## 2024-07-31 RX ORDER — KETOROLAC TROMETHAMINE 30 MG/ML
15 INJECTION, SOLUTION INTRAMUSCULAR; INTRAVENOUS ONCE
Refills: 0 | Status: DISCONTINUED | OUTPATIENT
Start: 2024-07-31 | End: 2024-07-31

## 2024-07-31 RX ORDER — KETOROLAC TROMETHAMINE 30 MG/ML
3 INJECTION, SOLUTION INTRAMUSCULAR; INTRAVENOUS
Qty: 36 | Refills: 0
Start: 2024-07-31 | End: 2024-08-02

## 2024-07-31 RX ORDER — ACETAMINOPHEN 500 MG/5ML
1000 LIQUID (ML) ORAL ONCE
Refills: 0 | Status: COMPLETED | OUTPATIENT
Start: 2024-07-31 | End: 2024-07-31

## 2024-07-31 RX ORDER — CYCLOBENZAPRINE HYDROCHLORIDE 15 MG/1
1 CAPSULE, EXTENDED RELEASE ORAL
Qty: 9 | Refills: 0
Start: 2024-07-31 | End: 2024-08-02

## 2024-07-31 RX ADMIN — Medication 400 MILLIGRAM(S): at 16:19

## 2024-07-31 RX ADMIN — KETOROLAC TROMETHAMINE 15 MILLIGRAM(S): 30 INJECTION, SOLUTION INTRAMUSCULAR; INTRAVENOUS at 16:49

## 2024-07-31 RX ADMIN — Medication 1000 MILLIGRAM(S): at 19:15

## 2024-07-31 RX ADMIN — Medication 1000 MILLILITER(S): at 16:25

## 2024-07-31 RX ADMIN — Medication 1000 MILLIGRAM(S): at 16:35

## 2024-07-31 RX ADMIN — KETOROLAC TROMETHAMINE 15 MILLIGRAM(S): 30 INJECTION, SOLUTION INTRAMUSCULAR; INTRAVENOUS at 18:32

## 2024-07-31 RX ADMIN — Medication 1000 MILLIGRAM(S): at 16:45

## 2024-07-31 RX ADMIN — KETOROLAC TROMETHAMINE 15 MILLIGRAM(S): 30 INJECTION, SOLUTION INTRAMUSCULAR; INTRAVENOUS at 19:00

## 2024-07-31 RX ADMIN — Medication 1000 MILLIGRAM(S): at 19:10

## 2024-07-31 RX ADMIN — KETOROLAC TROMETHAMINE 15 MILLIGRAM(S): 30 INJECTION, SOLUTION INTRAMUSCULAR; INTRAVENOUS at 17:00

## 2024-07-31 RX ADMIN — Medication 400 MILLIGRAM(S): at 19:01

## 2024-08-05 ENCOUNTER — APPOINTMENT (OUTPATIENT)
Dept: INTERNAL MEDICINE | Facility: CLINIC | Age: 64
End: 2024-08-05

## 2024-08-05 PROBLEM — K86.2 PANCREATIC CYST: Status: ACTIVE | Noted: 2024-08-05

## 2024-08-05 PROCEDURE — 99215 OFFICE O/P EST HI 40 MIN: CPT

## 2024-08-06 NOTE — HISTORY OF PRESENT ILLNESS
[FreeTextEntry2] : 64 y/o M here for f/u after recent ER discharge  ED Visit Summary:  HPI Objective Statement: 63 year old male with mid back pain radiating to the front today. Denies fever, trauma, sob, dysuria. Reports NV, focal weakness/numbness. No red flags. history of AILEEN more than 20 years ago, does not want opioid. Denies any other symptoms. Imaging Studies:  CTA Chest for dissection r/o:  FINDINGS: CHEST: LUNGS AND LARGE AIRWAYS: Patent central airways. Ovoid and triangular areas of thickening in bilateral major fissures likely representing intrapulmonary lymph nodes. Azygous lobe, normal variant. PLEURA: No pleural effusion. VESSELS No thoracic aortic aneurysm or thoracic aortic dissection. Evaluation of the pulmonary arteries is limited due to poor IV contrast opacification, but there is no CT evidence of central pulmonary embolism up to the lobar pulmonary arteries.: HEART: Heart size is normal. No pericardial effusion. Coronary artery calcifications. MEDIASTINUM AND TIANA: No lymphadenopathy. CHEST WALL AND LOWER NECK: Mild symmetric bilateral gynecomastia.  ABDOMEN AND PELVIS: LIVER: 2.2 cm cyst hepatic segment 6. Additional subcentimeter low-density liver lesions too small to characterize. Hepatomegaly 19.4 cm cephalocaudal length. BILE DUCTS: Normal caliber. GALLBLADDER: Within normal limits. Phrygian cap. SPLEEN: Within normal limits. PANCREAS: Mild pancreatic duct dilatation in the head of the pancreas likely 0.3 mm in diameter. Cluster of tiny cystic lesions in the head of the pancreas and uncinate process which may represent IPMN vs pseudocysts if there was prior pancreatitis. No pancreatic calcifications or peripancreatic inflammatory changes. ADRENALS: Within normal limits. KIDNEYS/URETERS: Subcentimeter low-density left lower pole renal lesion too small to characterize.  BLADDER: Unable to adequately evaluate because it is largely obscured by streak artifacts from bilateral hip arthroplasties, however there appears to be mild urinary bladder wall thickening. REPRODUCTIVE ORGANS: Unable to evaluate due to streak artifacts from bilateral hip arthroplasties. Prostatic calcifications.  BOWEL: No bowel obstruction. Appendix is normal. PERITONEUM: No ascites. VESSELS: No abdominal aortic aneurysm or abdominal aortic dissection. Mild atherosclerotic calcifications of the abdominal aorta, iliac and femoral arteries. No significant SMA, RORO, celiac axis, or renal artery stenosis. There is mild atherosclerotic calcification of the right renal artery origin. RETROPERITONEUM/LYMPH NODES: No lymphadenopathy. ABDOMINAL WALL: Surgical sutures about the umbilicus. Postsurgical changes in the subcutaneous tissues about the right and left lateral hips. BONES: Bilateral hip arthroplasties with streak artifacts obscuring detail. Degenerative changes throughout the spine worse at L5-S1, L4-5. Degenerative change of the pubic symphysis, SI joints, glenohumeral joints. Focal fatty replacement of right gluteus and medius and minimus muscles.  IMPRESSION: No aortic aneurysm, aortic dissection or central pulmonary embolism.  Tiny cystic lesions in the head of the pancreas. Mild pancreatic duct dilatation. Follow-up MRI/MRCP is recommended.  Suboptimal evaluation of the urinary bladder due to streak artifacts, but there may be mild urinary bladder wall thickening. Correlation with urinalysis recommended to exclude cystitis.  Otherwise, no CT evidence of acute intrathoracic or acute intra-abdominal pathology.  Patient endorsing still having the pain but it is mild now, 1/10. It starts in the back and wraps to the front. At its worse it was 9/10. Has not drank alcohol in over 25 years but prior to that there was consistent alcohol use. Currently having good oral intake, no n/v/d/c. Pain not related to food intake.   Labs from ER visit reviewed, wnl, no elevated lipase. coags wnl,  normal d-dimer, normal trop, normal bnp, normal u/A.

## 2024-08-06 NOTE — END OF VISIT
[FreeTextEntry3] : I spent an estimated total time of 45 minutes on this encounter on the date clinical services were rendered. This includes both face-to-face time and non-face-to-face time spent reviewing the patient's chart, prior lab results, relevant imaging/diagnostic studies, and documentation in the EHR. An estimated total of 10 minutes was spent answering the patient's and/or any family member's questions.

## 2024-08-06 NOTE — HISTORY OF PRESENT ILLNESS
Detail Level: Simple [FreeTextEntry2] : 64 y/o M here for f/u after recent ER discharge  ED Visit Summary:  HPI Objective Statement: 63 year old male with mid back pain radiating to the front today. Denies fever, trauma, sob, dysuria. Reports NV, focal weakness/numbness. No red flags. history of AILEEN more than 20 years ago, does not want opioid. Denies any other symptoms. Imaging Studies:  CTA Chest for dissection r/o:  FINDINGS: CHEST: LUNGS AND LARGE AIRWAYS: Patent central airways. Ovoid and triangular areas of thickening in bilateral major fissures likely representing intrapulmonary lymph nodes. Azygous lobe, normal variant. PLEURA: No pleural effusion. VESSELS No thoracic aortic aneurysm or thoracic aortic dissection. Evaluation of the pulmonary arteries is limited due to poor IV contrast opacification, but there is no CT evidence of central pulmonary embolism up to the lobar pulmonary arteries.: HEART: Heart size is normal. No pericardial effusion. Coronary artery calcifications. MEDIASTINUM AND TIANA: No lymphadenopathy. CHEST WALL AND LOWER NECK: Mild symmetric bilateral gynecomastia.  ABDOMEN AND PELVIS: LIVER: 2.2 cm cyst hepatic segment 6. Additional subcentimeter low-density liver lesions too small to characterize. Hepatomegaly 19.4 cm cephalocaudal length. BILE DUCTS: Normal caliber. GALLBLADDER: Within normal limits. Phrygian cap. SPLEEN: Within normal limits. PANCREAS: Mild pancreatic duct dilatation in the head of the pancreas likely 0.3 mm in diameter. Cluster of tiny cystic lesions in the head of the pancreas and uncinate process which may represent IPMN vs pseudocysts if there was prior pancreatitis. No pancreatic calcifications or peripancreatic inflammatory changes. ADRENALS: Within normal limits. KIDNEYS/URETERS: Subcentimeter low-density left lower pole renal lesion too small to characterize.  BLADDER: Unable to adequately evaluate because it is largely obscured by streak artifacts from bilateral hip arthroplasties, however there appears to be mild urinary bladder wall thickening. REPRODUCTIVE ORGANS: Unable to evaluate due to streak artifacts from bilateral hip arthroplasties. Prostatic calcifications.  BOWEL: No bowel obstruction. Appendix is normal. PERITONEUM: No ascites. VESSELS: No abdominal aortic aneurysm or abdominal aortic dissection. Mild atherosclerotic calcifications of the abdominal aorta, iliac and femoral arteries. No significant SMA, RORO, celiac axis, or renal artery stenosis. There is mild atherosclerotic calcification of the right renal artery origin. RETROPERITONEUM/LYMPH NODES: No lymphadenopathy. ABDOMINAL WALL: Surgical sutures about the umbilicus. Postsurgical changes in the subcutaneous tissues about the right and left lateral hips. BONES: Bilateral hip arthroplasties with streak artifacts obscuring detail. Degenerative changes throughout the spine worse at L5-S1, L4-5. Degenerative change of the pubic symphysis, SI joints, glenohumeral joints. Focal fatty replacement of right gluteus and medius and minimus muscles.  IMPRESSION: No aortic aneurysm, aortic dissection or central pulmonary embolism.  Tiny cystic lesions in the head of the pancreas. Mild pancreatic duct dilatation. Follow-up MRI/MRCP is recommended.  Suboptimal evaluation of the urinary bladder due to streak artifacts, but there may be mild urinary bladder wall thickening. Correlation with urinalysis recommended to exclude cystitis.  Otherwise, no CT evidence of acute intrathoracic or acute intra-abdominal pathology.  Patient endorsing still having the pain but it is mild now, 1/10. It starts in the back and wraps to the front. At its worse it was 9/10. Has not drank alcohol in over 25 years but prior to that there was consistent alcohol use. Currently having good oral intake, no n/v/d/c. Pain not related to food intake.   Labs from ER visit reviewed, wnl, no elevated lipase. coags wnl,  normal d-dimer, normal trop, normal bnp, normal u/A.

## 2024-08-06 NOTE — ASSESSMENT
[FreeTextEntry1] : #Pancreatic Cyst Pancreatic duct dilatation in the head of the pancreas w/ cluster of tiny cystic lesions in the head of the pancreas and uncinate which can be a concern for IPMN vs pseudocysts if there was a prior hx of pancreatitis. Patient does not recall a prior hx of pancreatitis. Will get further imaging with CT helical scan pancreas protocol. May need MRI/MRCP.   #Fatty Liver Liver Cyst < 4cm. No further imaging is needed Hepatomegaly w/ normal LFTs and Bilirubin. Should have hepatitis labs but otherwise this is a chronic issue from previous alcohol use leading to fatty liver. Can further characterize with MRI if develops any symptoms of fatigue, RUQ pain, jaundice.   #Intrapulmonary lymph nodes Likely benign in nature given no systemic symptoms concerning for malignancy. Can repeat CT Chest in 6-12 months.

## 2024-08-06 NOTE — PHYSICAL EXAM
[Normal Sclera/Conjunctiva] : normal sclera/conjunctiva [Normal Oropharynx] : the oropharynx was normal [No Edema] : there was no peripheral edema [Soft] : abdomen soft [Non Tender] : non-tender [Non-distended] : non-distended [No Rash] : no rash [No Focal Deficits] : no focal deficits [Normal Gait] : normal gait [Alert and Oriented x3] : oriented to person, place, and time [Normal] : affect was normal and insight and judgment were intact

## 2024-08-06 NOTE — HISTORY OF PRESENT ILLNESS
[FreeTextEntry2] : 62 y/o M here for f/u after recent ER discharge  ED Visit Summary:  HPI Objective Statement: 63 year old male with mid back pain radiating to the front today. Denies fever, trauma, sob, dysuria. Reports NV, focal weakness/numbness. No red flags. history of AILEEN more than 20 years ago, does not want opioid. Denies any other symptoms. Imaging Studies:  CTA Chest for dissection r/o:  FINDINGS: CHEST: LUNGS AND LARGE AIRWAYS: Patent central airways. Ovoid and triangular areas of thickening in bilateral major fissures likely representing intrapulmonary lymph nodes. Azygous lobe, normal variant. PLEURA: No pleural effusion. VESSELS No thoracic aortic aneurysm or thoracic aortic dissection. Evaluation of the pulmonary arteries is limited due to poor IV contrast opacification, but there is no CT evidence of central pulmonary embolism up to the lobar pulmonary arteries.: HEART: Heart size is normal. No pericardial effusion. Coronary artery calcifications. MEDIASTINUM AND TIANA: No lymphadenopathy. CHEST WALL AND LOWER NECK: Mild symmetric bilateral gynecomastia.  ABDOMEN AND PELVIS: LIVER: 2.2 cm cyst hepatic segment 6. Additional subcentimeter low-density liver lesions too small to characterize. Hepatomegaly 19.4 cm cephalocaudal length. BILE DUCTS: Normal caliber. GALLBLADDER: Within normal limits. Phrygian cap. SPLEEN: Within normal limits. PANCREAS: Mild pancreatic duct dilatation in the head of the pancreas likely 0.3 mm in diameter. Cluster of tiny cystic lesions in the head of the pancreas and uncinate process which may represent IPMN vs pseudocysts if there was prior pancreatitis. No pancreatic calcifications or peripancreatic inflammatory changes. ADRENALS: Within normal limits. KIDNEYS/URETERS: Subcentimeter low-density left lower pole renal lesion too small to characterize.  BLADDER: Unable to adequately evaluate because it is largely obscured by streak artifacts from bilateral hip arthroplasties, however there appears to be mild urinary bladder wall thickening. REPRODUCTIVE ORGANS: Unable to evaluate due to streak artifacts from bilateral hip arthroplasties. Prostatic calcifications.  BOWEL: No bowel obstruction. Appendix is normal. PERITONEUM: No ascites. VESSELS: No abdominal aortic aneurysm or abdominal aortic dissection. Mild atherosclerotic calcifications of the abdominal aorta, iliac and femoral arteries. No significant SMA, RORO, celiac axis, or renal artery stenosis. There is mild atherosclerotic calcification of the right renal artery origin. RETROPERITONEUM/LYMPH NODES: No lymphadenopathy. ABDOMINAL WALL: Surgical sutures about the umbilicus. Postsurgical changes in the subcutaneous tissues about the right and left lateral hips. BONES: Bilateral hip arthroplasties with streak artifacts obscuring detail. Degenerative changes throughout the spine worse at L5-S1, L4-5. Degenerative change of the pubic symphysis, SI joints, glenohumeral joints. Focal fatty replacement of right gluteus and medius and minimus muscles.  IMPRESSION: No aortic aneurysm, aortic dissection or central pulmonary embolism.  Tiny cystic lesions in the head of the pancreas. Mild pancreatic duct dilatation. Follow-up MRI/MRCP is recommended.  Suboptimal evaluation of the urinary bladder due to streak artifacts, but there may be mild urinary bladder wall thickening. Correlation with urinalysis recommended to exclude cystitis.  Otherwise, no CT evidence of acute intrathoracic or acute intra-abdominal pathology.  Patient endorsing still having the pain but it is mild now, 1/10. It starts in the back and wraps to the front. At its worse it was 9/10. Has not drank alcohol in over 25 years but prior to that there was consistent alcohol use. Currently having good oral intake, no n/v/d/c. Pain not related to food intake.   Labs from ER visit reviewed, wnl, no elevated lipase. coags wnl,  normal d-dimer, normal trop, normal bnp, normal u/A.

## 2024-08-09 ENCOUNTER — OUTPATIENT (OUTPATIENT)
Dept: OUTPATIENT SERVICES | Facility: HOSPITAL | Age: 64
LOS: 1 days | End: 2024-08-09
Payer: COMMERCIAL

## 2024-08-09 ENCOUNTER — APPOINTMENT (OUTPATIENT)
Dept: GASTROENTEROLOGY | Facility: CLINIC | Age: 64
End: 2024-08-09

## 2024-08-09 ENCOUNTER — APPOINTMENT (OUTPATIENT)
Dept: CT IMAGING | Facility: HOSPITAL | Age: 64
End: 2024-08-09

## 2024-08-09 DIAGNOSIS — K86.2 CYST OF PANCREAS: ICD-10-CM

## 2024-08-09 DIAGNOSIS — Z96.649 PRESENCE OF UNSPECIFIED ARTIFICIAL HIP JOINT: Chronic | ICD-10-CM

## 2024-08-09 PROCEDURE — 99203 OFFICE O/P NEW LOW 30 MIN: CPT

## 2024-08-09 PROCEDURE — 74177 CT ABD & PELVIS W/CONTRAST: CPT

## 2024-08-09 PROCEDURE — 74177 CT ABD & PELVIS W/CONTRAST: CPT | Mod: 26

## 2024-08-09 NOTE — PHYSICAL EXAM
[Abnormal Walk] : normal gait [Normal Color / Pigmentation] : normal skin color and pigmentation [Motor Exam] : the motor exam was normal [Normal] : oriented to person, place, and time [Oriented To Time, Place, And Person] : oriented to person, place, and time

## 2024-08-09 NOTE — ASSESSMENT
[FreeTextEntry1] : Abnormal CT imaging/questionable IPMN in head of pancreas Dilated pancreatic duct CT of the abdomen 8/9/24: Mild prominence of pre-ampullary pancreatic duct CT of abdomen performed 7/31 showing tiny cystic lesion in head of pancreas, may represent IPMN vs pseudocysts No alarm symptoms at this time No family history of pancreatic cancer Plan: Will proceed with MRI/MRCP for further evaluation Further recommendations pending results of study To follow up after completion of above  Epigastric pain-resolved Does still experience some back pain when sleeping Plan: No further workup

## 2024-08-09 NOTE — HISTORY OF PRESENT ILLNESS
[FreeTextEntry1] : 2018; 1 polyp removed was benign  [de-identified] : OWER CHEST: Within normal limits.  LIVER: 2.2 cm right hepatic cyst segment 6. A few additional tiny hypodense foci involving the liver are too small to characterize. BILE DUCTS: Normal caliber. GALLBLADDER: Contracted but otherwise unremarkable. SPLEEN: Within normal limits. PANCREAS: Mild prominence of the pancreatic duct at the head of the pancreas, just proximal to the papilla. No enhancing mass lesion or calculus to account for this appearance. No upstream ductal dilatation or atrophy. The etiology for pancreatic duct prominence is indeterminate but may be a normal variation. A yearly surveillance may be considered. 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 ADRENALS: Within normal limits. KIDNEYS/URETERS: Within normal limits.  BLADDER: Obscured by artifact from bilateral hip prostheses. REPRODUCTIVE ORGANS: Prostate obscured by the above described artifact despite metal artifact reduction technique.  BOWEL: No bowel obstruction. Appendix is normal. PERITONEUM/RETROPERITONEUM: Within normal limits. VESSELS: Within normal limits. LYMPH NODES: No lymphadenopathy. ABDOMINAL WALL: Within normal limits. BONES: Degenerative disc disease lumbar spine. Bilateral hip prostheses.  IMPRESSION: Mild prominence of the preampullary pancreatic duct as described above.    --- End of Report ---      DULCE CREWS MD; Attending Radiologist This document has been electronically signed. Aug  9 2024  9:38AM  Ordered by: ROSI HARPER       Collected/Examined: [id number] 08:59AM       Verified by: ROSI HARPER [id number] 09:53AM       Result Communication: Discussed results with patient; Stage: Final       Performed at: [facility] at [place]       Resulted: [id number] 09:07AM       Last Updated: [id number] 09:53AM       Accession: [id number]       Results Hx:	 There are no additional results to show Details:	 To Be Done:	[id number] Status:	Complete For:	Pancreatic cyst (K86.2) Overdue:	[id number] 03:25PM To Be Performed:	[facility] Communicated By:	Requested transmission to performing location Priority:	Routine Ordered By:	ROSI HARPER Supervised By:	ROSI HARPER Managed By:	ROSI HARPER Authorization:	Not Required Performing Instructions:	 Patient Instructions:	 Order Instructions:	STOP: For the following indications please change order to CT ABD PELVIS w/wo IV Cont   - Adrenal Protocol   - GI Bleeding Protocol   - Liver - 4 Phase Protocol (Prior embolization / ablation)   - Renal Mass Protocol   - Renal Lasix Urogram (Hematuria, Urothelial CA)   - Renal (NO LASIX due to allergy) Urogram (Hematuria, Urothelial CA) Questions:	 Clinical Indication A: Patient w/ pancreatic duct dilatation and multiple cysts in head of pancreas. Need further evaluation of pancreas. Protocol A: Pancreas Protocol (Pancreatitis/Pancreatic CA) Oral Contrast A: ORAL Contrast as per protocol Add'l Details:	 Financial Auth:	Approved Authorization #:	[id number] Appt. Status:	Appointment Needed Effective:	[id number] 12:00AM Expires:	[id number] 12:00AM Done:	07Sbh5633 08:59AM Order #:	[id number] Requisition #:	[id number] Label Type:	 Collection:	Collected 09Aug2024 08:51AM Specimen Identifier:	 ID:	 CPT:	22937 LOINC:	 SNOMED:	 Type:	 Charges:	None Will Be Collected in Office?	No Score:	0 NDS#:	 Content Source:	 Justification:	 View link item history	 Goals:	None Charging:	          (none) Encounters:	 Creation:	[id number] Appointment ROSI HARPER (Internal Medicine)  Collection:	None Specified Be Done By:	None Specified Scheduled:	None Specified Performed:	None Specified Charge :	None Specified Annotations*:	Showing All Annotations [id number] 09:53AM           Annotated By:	ROSI HARPER          Annotation:	 Discussed results w/ patient, has appt with GI today, will defer to GI's decision if warrants further MRI/MRCP at this time.  Electronically Signed By: ROSI HARPER MD 09-Aug-2024 8:51 AM [de-identified] : Liver: The liver is enlarged. Increased echogenicity is noted diffusely throughout the liver. 2.1 cm cyst is noted in the right lobe. Limited Doppler interrogation of the main portal vein demonstrates patency. Bile ducts: Normal caliber. Common bile duct measures 5 mm. Gallbladder: Within normal limits. Pancreas: The pancreatic region is obscured by overlying bowel gas limiting evaluation. Right kidney: 10.1 cm. No hydronephrosis. Ascites: None. IVC: Visualized portions are within normal limits.  IMPRESSION: Mild hepatomegaly with fatty infiltration of the liver noted. 2.1 cm hepatic cyst noted.    --- End of Report ---      LEW DAMON MD; Attending Radiologist This document has been electronically signed. Sep 13 2023 12:52PM  Ordered by: REYES, JOHN A       Collected/Examined: 13Sep2023 12:02PM       Verified by: REYES, JOHN A 27Sep2023 04:27PM       Result Communication: Left message for patient; Stage: Final       Performed at: [facility] at [place]       Resulted: 13Sep2023 12:18PM       Last Updated: 27Sep2023 04:27PM       Accession: [id number]       Results Hx:	 There are no additional results to show Details:	 Scheduled:	 Status:	Complete For:	 Recorded as History:	27Sep2023 04:27PM Overdue:	[id number] 12:00AM To Be Performed:	 Communicated By:	Recorded Priority:	Routine Ordered By:	REYES, JOHN A Supervised By:	REYES, JOHN A Managed By:	REYES, JOHN A Authorization:	Not Required Performing Instructions:	 Patient Instructions:	 Order Instructions:	 Questions:	          (none) Add'l Details:	 Financial Auth:	 Authorization #:	 Appt. Status:	Appointment Not Needed Effective:	[id number] 12:55PM Expires:	13Sep2023 12:55PM Done:	[id number] 12:02PM Order #:	[id number] Requisition #:	[id number] Label Type:	 Collection:	Collection Specimen Identifier:	 ID:	 CPT:	18265 LOINC:	 SNOMED:	 Type:	 Charges:	None Will Be Collected in Office?	No Score:	0 NDS#:	 Content Source:	 Justification:	 View link item history	 Goals:	None Charging:	 Override Encounter Details: Special Billing:	 Account #:	 Injury Date:	9/13/2023 12:55:40 PM Description:	 Encounters:	 Creation:	[id number] Result Review REYES, JOHN A (Internal Medicine)  Collection:	None Specified Be Done By:	None Specified Scheduled:	None Specified Performed:	None Specified Charge :	None Specified Annotations:	          (none)

## 2024-08-20 ENCOUNTER — APPOINTMENT (OUTPATIENT)
Dept: MRI IMAGING | Facility: HOSPITAL | Age: 64
End: 2024-08-20

## 2024-08-26 ENCOUNTER — APPOINTMENT (OUTPATIENT)
Dept: MRI IMAGING | Facility: CLINIC | Age: 64
End: 2024-08-26
Payer: COMMERCIAL

## 2024-08-26 ENCOUNTER — OUTPATIENT (OUTPATIENT)
Dept: OUTPATIENT SERVICES | Facility: HOSPITAL | Age: 64
LOS: 1 days | End: 2024-08-26
Payer: COMMERCIAL

## 2024-08-26 DIAGNOSIS — R93.5 ABNORMAL FINDINGS ON DIAGNOSTIC IMAGING OF OTHER ABDOMINAL REGIONS, INCLUDING RETROPERITONEUM: ICD-10-CM

## 2024-08-26 DIAGNOSIS — Z96.649 PRESENCE OF UNSPECIFIED ARTIFICIAL HIP JOINT: Chronic | ICD-10-CM

## 2024-08-26 PROCEDURE — 74183 MRI ABD W/O CNTR FLWD CNTR: CPT | Mod: 26

## 2024-08-26 PROCEDURE — 74183 MRI ABD W/O CNTR FLWD CNTR: CPT

## 2024-08-26 PROCEDURE — A9585: CPT

## 2024-08-28 ENCOUNTER — APPOINTMENT (OUTPATIENT)
Dept: INTERNAL MEDICINE | Facility: CLINIC | Age: 64
End: 2024-08-28
Payer: COMMERCIAL

## 2024-08-28 VITALS
DIASTOLIC BLOOD PRESSURE: 70 MMHG | HEART RATE: 74 BPM | BODY MASS INDEX: 24.05 KG/M2 | RESPIRATION RATE: 16 BRPM | WEIGHT: 168 LBS | TEMPERATURE: 97.1 F | SYSTOLIC BLOOD PRESSURE: 110 MMHG | HEIGHT: 70 IN | OXYGEN SATURATION: 97 %

## 2024-08-28 DIAGNOSIS — R93.5 ABNORMAL FINDINGS ON DIAGNOSTIC IMAGING OF OTHER ABDOMINAL REGIONS, INCLUDING RETROPERITONEUM: ICD-10-CM

## 2024-08-28 DIAGNOSIS — R73.03 PREDIABETES.: ICD-10-CM

## 2024-08-28 DIAGNOSIS — E66.9 OVERWEIGHT: ICD-10-CM

## 2024-08-28 DIAGNOSIS — E66.3 OVERWEIGHT: ICD-10-CM

## 2024-08-28 DIAGNOSIS — E78.5 HYPERLIPIDEMIA, UNSPECIFIED: ICD-10-CM

## 2024-08-28 PROCEDURE — 99214 OFFICE O/P EST MOD 30 MIN: CPT

## 2024-08-28 NOTE — HISTORY OF PRESENT ILLNESS
[FreeTextEntry1] : back pain ER follow up GI reccs [de-identified] : was in ED had CT's back pain toke muskuloskeletal pain and had CT AP repeated no mass

## 2024-11-26 ENCOUNTER — RX RENEWAL (OUTPATIENT)
Age: 64
End: 2024-11-26

## 2025-01-15 NOTE — ED PROVIDER NOTE - MUSCULOSKELETAL, MLM
Does patient have IHP or MCR Adv HMO:      [facility] OPEN ACCESS     First appt was 01/14/2025@ 10am   Provider's Name: [facility]    Phone # [phone]  FAX:[phone]    Specialty: Rehabilitation       Reason for Visit: physical therapy      Has patient seen this specialist before:  no  If so LOV:  no    Number of Visits Requested: 10     Is Appt. Already Scheduled: yes 01/17/2025 @ 9:00am            Has patient seen our provider for reason:  yes    Spine appears normal, range of motion is not limited, no muscle or joint tenderness

## 2025-02-06 ENCOUNTER — APPOINTMENT (OUTPATIENT)
Dept: INTERNAL MEDICINE | Facility: CLINIC | Age: 65
End: 2025-02-06
Payer: COMMERCIAL

## 2025-02-06 VITALS
OXYGEN SATURATION: 97 % | HEART RATE: 80 BPM | TEMPERATURE: 97.8 F | DIASTOLIC BLOOD PRESSURE: 70 MMHG | WEIGHT: 171 LBS | BODY MASS INDEX: 24.48 KG/M2 | SYSTOLIC BLOOD PRESSURE: 114 MMHG | HEIGHT: 70 IN | RESPIRATION RATE: 16 BRPM

## 2025-02-06 DIAGNOSIS — Z00.00 ENCOUNTER FOR GENERAL ADULT MEDICAL EXAMINATION W/OUT ABNORMAL FINDINGS: ICD-10-CM

## 2025-02-06 PROCEDURE — G0444 DEPRESSION SCREEN ANNUAL: CPT

## 2025-02-06 PROCEDURE — 99396 PREV VISIT EST AGE 40-64: CPT

## 2025-04-10 ENCOUNTER — NON-APPOINTMENT (OUTPATIENT)
Age: 65
End: 2025-04-10

## 2025-05-25 ENCOUNTER — INPATIENT (INPATIENT)
Facility: HOSPITAL | Age: 65
LOS: 0 days | Discharge: HOME | DRG: 399 | End: 2025-05-26
Attending: INTERNAL MEDICINE | Admitting: FAMILY MEDICINE
Payer: COMMERCIAL

## 2025-05-25 ENCOUNTER — TRANSCRIPTION ENCOUNTER (OUTPATIENT)
Age: 65
End: 2025-05-25

## 2025-05-25 VITALS
HEIGHT: 70 IN | DIASTOLIC BLOOD PRESSURE: 66 MMHG | SYSTOLIC BLOOD PRESSURE: 104 MMHG | HEART RATE: 78 BPM | WEIGHT: 175.05 LBS | TEMPERATURE: 98 F | RESPIRATION RATE: 18 BRPM

## 2025-05-25 DIAGNOSIS — K35.80 UNSPECIFIED ACUTE APPENDICITIS: ICD-10-CM

## 2025-05-25 DIAGNOSIS — Z96.649 PRESENCE OF UNSPECIFIED ARTIFICIAL HIP JOINT: Chronic | ICD-10-CM

## 2025-05-25 LAB
ALBUMIN SERPL ELPH-MCNC: 3.1 G/DL — LOW (ref 3.3–5)
ALP SERPL-CCNC: 67 U/L — SIGNIFICANT CHANGE UP (ref 40–120)
ALT FLD-CCNC: 17 U/L — SIGNIFICANT CHANGE UP (ref 10–45)
ANION GAP SERPL CALC-SCNC: 7 MMOL/L — SIGNIFICANT CHANGE UP (ref 5–17)
APPEARANCE UR: CLEAR — SIGNIFICANT CHANGE UP
AST SERPL-CCNC: 13 U/L — SIGNIFICANT CHANGE UP (ref 10–40)
BASOPHILS # BLD AUTO: 0.02 K/UL — SIGNIFICANT CHANGE UP (ref 0–0.2)
BASOPHILS NFR BLD AUTO: 0.2 % — SIGNIFICANT CHANGE UP (ref 0–2)
BILIRUB SERPL-MCNC: 0.7 MG/DL — SIGNIFICANT CHANGE UP (ref 0.2–1.2)
BILIRUB UR-MCNC: NEGATIVE — SIGNIFICANT CHANGE UP
BUN SERPL-MCNC: 20 MG/DL — SIGNIFICANT CHANGE UP (ref 7–23)
CALCIUM SERPL-MCNC: 9.5 MG/DL — SIGNIFICANT CHANGE UP (ref 8.4–10.5)
CHLORIDE SERPL-SCNC: 105 MMOL/L — SIGNIFICANT CHANGE UP (ref 96–108)
CO2 SERPL-SCNC: 29 MMOL/L — SIGNIFICANT CHANGE UP (ref 22–31)
COLOR SPEC: YELLOW — SIGNIFICANT CHANGE UP
CREAT SERPL-MCNC: 0.96 MG/DL — SIGNIFICANT CHANGE UP (ref 0.5–1.3)
DIFF PNL FLD: NEGATIVE — SIGNIFICANT CHANGE UP
EGFR: 89 ML/MIN/1.73M2 — SIGNIFICANT CHANGE UP
EGFR: 89 ML/MIN/1.73M2 — SIGNIFICANT CHANGE UP
EOSINOPHIL # BLD AUTO: 0.09 K/UL — SIGNIFICANT CHANGE UP (ref 0–0.5)
EOSINOPHIL NFR BLD AUTO: 0.8 % — SIGNIFICANT CHANGE UP (ref 0–6)
GLUCOSE SERPL-MCNC: 109 MG/DL — HIGH (ref 70–99)
GLUCOSE UR QL: NEGATIVE MG/DL — SIGNIFICANT CHANGE UP
HCT VFR BLD CALC: 40.2 % — SIGNIFICANT CHANGE UP (ref 39–50)
HGB BLD-MCNC: 12.9 G/DL — LOW (ref 13–17)
IMM GRANULOCYTES NFR BLD AUTO: 0.4 % — SIGNIFICANT CHANGE UP (ref 0–0.9)
INR BLD: 1.1 RATIO — SIGNIFICANT CHANGE UP (ref 0.85–1.16)
KETONES UR QL: NEGATIVE MG/DL — SIGNIFICANT CHANGE UP
LEUKOCYTE ESTERASE UR-ACNC: NEGATIVE — SIGNIFICANT CHANGE UP
LIDOCAIN IGE QN: 23 U/L — SIGNIFICANT CHANGE UP (ref 16–77)
LYMPHOCYTES # BLD AUTO: 1.64 K/UL — SIGNIFICANT CHANGE UP (ref 1–3.3)
LYMPHOCYTES # BLD AUTO: 14.5 % — SIGNIFICANT CHANGE UP (ref 13–44)
MCHC RBC-ENTMCNC: 26.6 PG — LOW (ref 27–34)
MCHC RBC-ENTMCNC: 32.1 G/DL — SIGNIFICANT CHANGE UP (ref 32–36)
MCV RBC AUTO: 82.9 FL — SIGNIFICANT CHANGE UP (ref 80–100)
MONOCYTES # BLD AUTO: 0.74 K/UL — SIGNIFICANT CHANGE UP (ref 0–0.9)
MONOCYTES NFR BLD AUTO: 6.5 % — SIGNIFICANT CHANGE UP (ref 2–14)
NEUTROPHILS # BLD AUTO: 8.79 K/UL — HIGH (ref 1.8–7.4)
NEUTROPHILS NFR BLD AUTO: 77.6 % — HIGH (ref 43–77)
NITRITE UR-MCNC: NEGATIVE — SIGNIFICANT CHANGE UP
NRBC BLD AUTO-RTO: 0 /100 WBCS — SIGNIFICANT CHANGE UP (ref 0–0)
PH UR: 7 — SIGNIFICANT CHANGE UP (ref 5–8)
PLATELET # BLD AUTO: 387 K/UL — SIGNIFICANT CHANGE UP (ref 150–400)
POTASSIUM SERPL-MCNC: 4.3 MMOL/L — SIGNIFICANT CHANGE UP (ref 3.5–5.3)
POTASSIUM SERPL-SCNC: 4.3 MMOL/L — SIGNIFICANT CHANGE UP (ref 3.5–5.3)
PROT SERPL-MCNC: 6.7 G/DL — SIGNIFICANT CHANGE UP (ref 6–8.3)
PROT UR-MCNC: NEGATIVE MG/DL — SIGNIFICANT CHANGE UP
PROTHROM AB SERPL-ACNC: 13 SEC — SIGNIFICANT CHANGE UP (ref 9.9–13.4)
RBC # BLD: 4.85 M/UL — SIGNIFICANT CHANGE UP (ref 4.2–5.8)
RBC # FLD: 14.1 % — SIGNIFICANT CHANGE UP (ref 10.3–14.5)
SODIUM SERPL-SCNC: 141 MMOL/L — SIGNIFICANT CHANGE UP (ref 135–145)
SP GR SPEC: 1.06 — HIGH (ref 1–1.03)
UROBILINOGEN FLD QL: 0.2 MG/DL — SIGNIFICANT CHANGE UP (ref 0.2–1)
WBC # BLD: 11.32 K/UL — HIGH (ref 3.8–10.5)
WBC # FLD AUTO: 11.32 K/UL — HIGH (ref 3.8–10.5)

## 2025-05-25 PROCEDURE — 99285 EMERGENCY DEPT VISIT HI MDM: CPT

## 2025-05-25 PROCEDURE — 88304 TISSUE EXAM BY PATHOLOGIST: CPT | Mod: 26

## 2025-05-25 PROCEDURE — 44970 LAPAROSCOPY APPENDECTOMY: CPT

## 2025-05-25 PROCEDURE — 74177 CT ABD & PELVIS W/CONTRAST: CPT | Mod: 26

## 2025-05-25 PROCEDURE — 99223 1ST HOSP IP/OBS HIGH 75: CPT

## 2025-05-25 PROCEDURE — 44970 LAPAROSCOPY APPENDECTOMY: CPT | Mod: AS

## 2025-05-25 DEVICE — ARISTA 3GR: Type: IMPLANTABLE DEVICE | Status: FUNCTIONAL

## 2025-05-25 DEVICE — STAPLER COVIDIEN TRI-STAPLE 45MM TAN RELOAD: Type: IMPLANTABLE DEVICE | Status: FUNCTIONAL

## 2025-05-25 RX ORDER — SODIUM CHLORIDE 9 G/1000ML
1000 INJECTION, SOLUTION INTRAVENOUS
Refills: 0 | Status: DISCONTINUED | OUTPATIENT
Start: 2025-05-25 | End: 2025-05-25

## 2025-05-25 RX ORDER — ACETAMINOPHEN 500 MG/5ML
975 LIQUID (ML) ORAL EVERY 6 HOURS
Refills: 0 | Status: DISCONTINUED | OUTPATIENT
Start: 2025-05-25 | End: 2025-05-26

## 2025-05-25 RX ORDER — ENOXAPARIN SODIUM 100 MG/ML
40 INJECTION SUBCUTANEOUS EVERY 24 HOURS
Refills: 0 | Status: DISCONTINUED | OUTPATIENT
Start: 2025-05-26 | End: 2025-05-26

## 2025-05-25 RX ORDER — PIPERACILLIN-TAZO-DEXTROSE,ISO 3.375G/5
3.38 IV SOLUTION, PIGGYBACK PREMIX FROZEN(ML) INTRAVENOUS EVERY 8 HOURS
Refills: 0 | Status: DISCONTINUED | OUTPATIENT
Start: 2025-05-25 | End: 2025-05-26

## 2025-05-25 RX ORDER — ONDANSETRON HCL/PF 4 MG/2 ML
4 VIAL (ML) INJECTION EVERY 8 HOURS
Refills: 0 | Status: DISCONTINUED | OUTPATIENT
Start: 2025-05-25 | End: 2025-05-25

## 2025-05-25 RX ORDER — OXYCODONE HYDROCHLORIDE 30 MG/1
5 TABLET ORAL EVERY 6 HOURS
Refills: 0 | Status: DISCONTINUED | OUTPATIENT
Start: 2025-05-25 | End: 2025-05-26

## 2025-05-25 RX ORDER — OXYCODONE HYDROCHLORIDE 30 MG/1
10 TABLET ORAL EVERY 6 HOURS
Refills: 0 | Status: DISCONTINUED | OUTPATIENT
Start: 2025-05-25 | End: 2025-05-26

## 2025-05-25 RX ORDER — ONDANSETRON HCL/PF 4 MG/2 ML
4 VIAL (ML) INJECTION ONCE
Refills: 0 | Status: DISCONTINUED | OUTPATIENT
Start: 2025-05-25 | End: 2025-05-25

## 2025-05-25 RX ORDER — HYDROMORPHONE/SOD CHLOR,ISO/PF 2 MG/10 ML
0.5 SYRINGE (ML) INJECTION
Refills: 0 | Status: DISCONTINUED | OUTPATIENT
Start: 2025-05-25 | End: 2025-05-25

## 2025-05-25 RX ORDER — TADALAFIL 20 MG/1
1 TABLET, FILM COATED ORAL
Refills: 0 | DISCHARGE

## 2025-05-25 RX ORDER — PIPERACILLIN-TAZO-DEXTROSE,ISO 3.375G/5
3.38 IV SOLUTION, PIGGYBACK PREMIX FROZEN(ML) INTRAVENOUS ONCE
Refills: 0 | Status: DISCONTINUED | OUTPATIENT
Start: 2025-05-25 | End: 2025-05-25

## 2025-05-25 RX ORDER — SODIUM CHLORIDE 9 G/1000ML
1000 INJECTION, SOLUTION INTRAVENOUS
Refills: 0 | Status: DISCONTINUED | OUTPATIENT
Start: 2025-05-25 | End: 2025-05-26

## 2025-05-25 RX ORDER — PIPERACILLIN-TAZO-DEXTROSE,ISO 3.375G/5
3.38 IV SOLUTION, PIGGYBACK PREMIX FROZEN(ML) INTRAVENOUS ONCE
Refills: 0 | Status: COMPLETED | OUTPATIENT
Start: 2025-05-25 | End: 2025-05-25

## 2025-05-25 RX ORDER — ONDANSETRON HCL/PF 4 MG/2 ML
4 VIAL (ML) INJECTION EVERY 6 HOURS
Refills: 0 | Status: DISCONTINUED | OUTPATIENT
Start: 2025-05-25 | End: 2025-05-26

## 2025-05-25 RX ORDER — ACETAMINOPHEN 500 MG/5ML
1000 LIQUID (ML) ORAL ONCE
Refills: 0 | Status: COMPLETED | OUTPATIENT
Start: 2025-05-25 | End: 2025-05-25

## 2025-05-25 RX ORDER — HYDROMORPHONE/SOD CHLOR,ISO/PF 2 MG/10 ML
1 SYRINGE (ML) INJECTION
Refills: 0 | Status: DISCONTINUED | OUTPATIENT
Start: 2025-05-25 | End: 2025-05-25

## 2025-05-25 RX ORDER — ACETAMINOPHEN 500 MG/5ML
1000 LIQUID (ML) ORAL EVERY 6 HOURS
Refills: 0 | Status: DISCONTINUED | OUTPATIENT
Start: 2025-05-25 | End: 2025-05-26

## 2025-05-25 RX ORDER — ORAL SEMAGLUTIDE 14 MG/1
1.7 TABLET ORAL
Refills: 0 | DISCHARGE

## 2025-05-25 RX ADMIN — Medication 400 MILLIGRAM(S): at 07:38

## 2025-05-25 RX ADMIN — Medication 200 GRAM(S): at 09:57

## 2025-05-25 RX ADMIN — Medication 25 GRAM(S): at 16:02

## 2025-05-25 RX ADMIN — SODIUM CHLORIDE 100 MILLILITER(S): 9 INJECTION, SOLUTION INTRAVENOUS at 21:28

## 2025-05-25 RX ADMIN — Medication 400 MILLIGRAM(S): at 21:29

## 2025-05-25 RX ADMIN — Medication 1000 MILLIGRAM(S): at 08:08

## 2025-05-25 RX ADMIN — Medication 1000 MILLILITER(S): at 07:37

## 2025-05-25 NOTE — ED PROVIDER NOTE - NSICDXFAMILYHX_GEN_ALL_CORE_FT
FAMILY HISTORY:  Father  Still living? Unknown  FH: emphysema, Age at diagnosis: Age Unknown    Mother  Still living? Unknown  FH: type 2 diabetes, Age at diagnosis: Age Unknown

## 2025-05-25 NOTE — H&P ADULT - NSHPPHYSICALEXAM_GEN_ALL_CORE
T(C): 36.9 (05-25-25 @ 06:54), Max: 36.9 (05-25-25 @ 06:54)  HR: 78 (05-25-25 @ 06:54) (78 - 78)  BP: 104/66 (05-25-25 @ 06:54) (104/66 - 104/66)  RR: 18 (05-25-25 @ 06:54) (18 - 18)  SpO2: --  Wt(kg): --Vital Signs Last 24 Hrs  T(C): 36.9 (25 May 2025 06:54), Max: 36.9 (25 May 2025 06:54)  T(F): 98.4 (25 May 2025 06:54), Max: 98.4 (25 May 2025 06:54)  HR: 78 (25 May 2025 06:54) (78 - 78)  BP: 104/66 (25 May 2025 06:54) (104/66 - 104/66)  BP(mean): --  RR: 18 (25 May 2025 06:54) (18 - 18)  SpO2: --    Parameters below as of 25 May 2025 06:54  Patient On (Oxygen Delivery Method): room air        PHYSICAL EXAM:  GENERAL: NAD  HENT:  Atraumatic, Normocephalic; No tonsillar erythema, exudates, or enlargement; Moist mucous membranes;   EYES: EOMI, PERRLA, conjunctiva and sclera clear, no lid-lag  NECK: Supple, No JVD, Normal thyroid  NERVOUS SYSTEM:  CN II - XII intact; Sensation intact; Motor Strength 5/5 B/L upper and lower extremities. Alert and oriented x 3  CHEST/LUNG:  No rales, rhonchi, wheezing, or rubs; normal respiratory effort, no intercostal retractions; No pitting edema  HEART: Regular rate and rhythm; No murmurs, rubs, or gallops  ABDOMEN: Soft, TTP in RLQ. Nondistended; Bowel sounds present; No HSM  MUSCULOSKELETAL/EXTREMITIES:  2+ Peripheral Pulses, No clubbing, or digital cyanosis  SKIN: No rashes or lesions; normal texture and temperature  PSYCH: Appropriate affect

## 2025-05-25 NOTE — H&P ADULT - HISTORY OF PRESENT ILLNESS
65yo male with PMHx of HLD presenting to the ED with c/o abdominal pain. Pt states that he started with per-umbilical abdominal pain/pressure yesterday morning after breakfast. Over the day the pain subsided. Tolerated lunch, and developed chills in the evening. Went to bed and upon wakening the pain had migrated to the right lower abdomen. Denies cp, sob, palp, ha, fever or other complaints.

## 2025-05-25 NOTE — PATIENT PROFILE ADULT - FALL HARM RISK - HARM RISK INTERVENTIONS

## 2025-05-25 NOTE — BRIEF OPERATIVE NOTE - NSICDXBRIEFPOSTOP_GEN_ALL_CORE_FT
POST-OP DIAGNOSIS:  Appendicitis, acute 25-May-2025 14:40:33  Jose Joseph  Acute colitis 25-May-2025 14:40:44  Jose Joseph

## 2025-05-25 NOTE — H&P ADULT - NSHPREVIEWOFSYSTEMS_GEN_ALL_CORE
CONSTITUTIONAL: No fever, weight loss, or fatigue  EYES: No eye pain, visual disturbances, or discharge  ENMT:  No difficulty hearing, tinnitus, vertigo; No sinus or throat pain  NECK: No pain or stiffness  RESPIRATORY: No cough, wheezing, chills or hemoptysis; No shortness of breath  CARDIOVASCULAR: No chest pain, palpitations, dizziness, or leg swelling  GASTROINTESTINAL:  +abdominal pain. No vomiting, or hematemesis; No diarrhea or constipation. No melena or hematochezia.  GENITOURINARY: No dysuria, frequency, hematuria, or incontinence  NEUROLOGICAL: No headaches, memory loss, loss of strength, numbness, or tremors  SKIN: No itching, burning, rashes, or lesions   MUSCULOSKELETAL: No joint pain or swelling; No muscle, back, or extremity pain  PSYCHIATRIC: No depression, anxiety, mood swings, or difficulty sleeping  ALLERGY AND IMMUNOLOGIC: No hives or eczema    ALL ROS REVIEWED AND NORMAL EXCEPT AS STATED ABOVE

## 2025-05-25 NOTE — ED PROVIDER NOTE - OBJECTIVE STATEMENT
63 yo M of D who is here for abdominal pain. Reports abdominal pain, epigastric area after breakfast, constant then resolved. Then had right lower abdomen pain on palpitation. Denies nausea, vomiting, diarrhea. Last BM yesterday.     denies urinary symptoms, testicular pain or swelling   denies any previous intraabdominal surgeries.

## 2025-05-25 NOTE — H&P ADULT - ASSESSMENT
63yo male with PMHx of HLD, hx of obesity being admitted to [facility] with acute appendicitis.     #Acute Appendicitis  #Abdominal Pain  -CT reviewed as above  -Surgery consulted in ED, Dr. Joseph planning for OR this afternoon  -ekg personally reviewed as above. compared to ekg from July 2024. no changes noted.   -pt medically optimized for procedure  -s/p dose of zosyn in ED  -keep NPO for procedure  -f/u surgical report and further recs by surgical team      #HLD  -continue statin    #Overweight  #hx of obesity  -continue on wegovy at home    #DVT PPX  -holding as pt with planned surgical procedure this afternoon    discussed with pt at bedside. all questions answered.  65yo male with PMHx of HLD, hx of obesity being admitted to [facility] with acute appendicitis.     #Acute Appendicitis  #Abdominal Pain  -CT reviewed as above  -Surgery consulted in ED, Dr. Joseph planning for OR this afternoon  -ekg personally reviewed as above. compared to ekg from July 2024. no changes noted.   -pt medically optimized for procedure  -s/p dose of zosyn in ED  -keep NPO for procedure  -f/u surgical report and further recs by surgical team    #HLD  -continue statin    #Overweight  #hx of obesity  -continue on wegovy at home    #GOC - full code    #DVT PPX  -holding as pt with planned surgical procedure this afternoon    discussed with pt at bedside. all questions answered.

## 2025-05-25 NOTE — CONSULT NOTE ADULT - ASSESSMENT
OFFICE VISIT      Patient: Siobhan Nash Date of Service: 6/10/2022   : 1962 MRN: [id number]     SUBJECTIVE:     Chief Complaint   Patient presents with   • Cough   • Congestion       HISTORY OF PRESENT ILLNESS:  Siobhan Nash is a 60 year old female who presents today for evaluation of cough and chest congestion. Pt reports that her symptoms started 10 days ago and it started with a cough. Her cough has progressively gotten worse and she started wheezing. She started getting SOB while performing her ADL's. Gets PETERS even with 20 steps. She has been using her albuterol inhaler as prescribed however her cough was not going away. Cough became productive with thick, yellowish, foamy mucus. Pt thinks that her symptoms are getting a little better than last week however she is very concerned about her shortness of breath.. Pt reports that she has seasonal allergies and takes antihistamines. She also has runny nose. Denies fever, chills, GI symptoms, eye/ear symptoms. Pt is a type 2 diabetic and her blood sugar has been poorly controlled after she started getting sick. She is taking her metformin and Glipizide daily instead of twice daily. Pt received 3 doses of COVID vaccines. No known exposure to COVID. Pt is former smoker. Quit smoking 10 yrs ago.      No past medical history on file.    Current Outpatient Medications   Medication Sig   • atorvastatin (LIPITOR) 10 MG tablet Take 10 mg by mouth daily.   • desloratadine (Clarinex) 5 MG tablet Take by mouth daily.   • escitalopram (LEXAPRO) 10 MG tablet Take 10 mg by mouth daily.   • glipiZIDE (GLUCOTROL) 10 MG tablet Take 10 mg by mouth.   • metformin (GLUCOPHAGE) 1000 MG tablet Take 1,000 mg by mouth.   • lisinopril (ZESTRIL) 10 MG tablet Take 10 mg by mouth daily.   • montelukast (SINGULAIR) 10 MG tablet Take 10 mg by mouth daily.     No current facility-administered medications for this visit.       ALLERGIES:  No Known Allergies    No past surgical history on  file.    No family history on file.    Social History     Tobacco Use   Smoking Status Not on file   Smokeless Tobacco Not on file       Immunization History   Administered Date(s) Administered   • COVID-19 Moderna Vaccine 03/05/2021, 04/02/2021, 04/02/2022       Review of Systems   Constitutional: Positive for fatigue. Negative for activity change, appetite change, chills, diaphoresis and unexpected weight change.   HENT: Positive for congestion, rhinorrhea and sneezing. Negative for ear discharge, ear pain and sinus pain.    Eyes: Negative for pain, discharge and itching.   Respiratory: Positive for cough, chest tightness, shortness of breath and wheezing.    Cardiovascular: Negative for palpitations.         OBJECTIVE:     Visit Vitals  /80 (BP Location: LUE - Left upper extremity, Patient Position: Sitting, Cuff Size: Large Adult)   Pulse 83   Temp 98.4 °F (36.9 °C) (Temporal)   Resp 12   Ht 4' 11\" (1.499 m)   Wt 106.6 kg (235 lb)   SpO2 95%   BMI 47.46 kg/m²        Physical Exam  Vitals reviewed.   Constitutional:       Appearance: Normal appearance. She is ill-appearing.   HENT:      Head: Normocephalic and atraumatic.      Nose: Mucosal edema, congestion and rhinorrhea present.      Right Turbinates: Enlarged.      Left Turbinates: Enlarged.      Mouth/Throat:      Mouth: Mucous membranes are moist.   Eyes:      Extraocular Movements: Extraocular movements intact.      Pupils: Pupils are equal, round, and reactive to light.   Cardiovascular:      Rate and Rhythm: Normal rate.      Pulses: Normal pulses.   Pulmonary:      Breath sounds: Wheezing (expiratory wheezing bilaterally) and rhonchi present.      Comments: O2 sat fluctuating between 93-95 % on room air without any activity.  Patient having difficulty taking breaths while talking  Musculoskeletal:      Cervical back: Normal range of motion and neck supple.   Lymphadenopathy:      Cervical: No cervical adenopathy.   Neurological:      Mental  Status: She is alert.         DIAGNOSTIC STUDIES:   LAB RESULTS:  Results for orders placed or performed in visit on 06/10/22   POCT SARS-COV-2 ANTIGEN   Result Value    POCT SARS-COV-2 ANTIGEN Not Detected    Internal Procedural Controls Acceptable Yes    TEST LOT NUMBER [id number]    TEST LOT EXPIRATION DATE 08/09/2023       ASSESSMENT AND PLAN:     Problem List Items Addressed This Visit     None      Visit Diagnoses     Viral URI with cough    -  Primary    Relevant Orders    POCT SARS-COV-2 ANTIGEN (Completed)        Siobhan was seen today for cough and congestion.    Diagnoses and all orders for this visit:    Viral URI with cough  -     POCT SARS-COV-2 ANTIGEN      - Discussed today's exam findings with patient. Advised patient to go to an ER for higher level of evaluation for possible O2 therapy, nebulizer treatment and chest Xray as she is experiencing SOB and PETERS. Patient is here with her  today. She refused for ambulance transfer to the ER. She said ambulance will take her to [facility] (nearest ER) and she doesn't want to go to [facility]. Said her  will drive her to [facility] ER now. Patient left the office in no acute distress and understanding the plan of care. All questions were answered to satisfaction.      FOLLOW UP: Patient will go to [facility] ER for higher level of evaluation.    Patient Instructions provided as documented in the AVS.    Discussed with patient (parent) findings on exam. Discussed supportive care and treatment plan and that if symptoms are not getting better or feels symptoms are getting worse then they should have further evaluation. Patient (parent) verbalized understanding of the plan.     RYLEE Kelley             a/p    acute appendicitis      admit  npo  iv fluids  iv antibioitcs    OR booked for today for "laparoscopic appendectomy"    procedure risks and benefits explaine dto patient and wife    (Will followup prominent pancreatic duct with outpatient MRCP).        thank you    dr scar sheikh  cell#[phone]

## 2025-05-25 NOTE — ED ADULT NURSE NOTE - ISOLATION TYPE:
Asad Hood is calling to request a refill on the following medication(s):  Requested Prescriptions     Pending Prescriptions Disp Refills    metFORMIN (GLUCOPHAGE) 1000 MG tablet [Pharmacy Med Name: metFORMIN HCl 1000 MG Oral Tablet] 60 tablet 5     Sig: TAKE 1 TABLET BY MOUTH TWICE DAILY WITH MEALS       Last Visit Date (If Applicable):  12/4/2024    Next Visit Date:    5/7/2025  
None

## 2025-05-25 NOTE — ED PROVIDER NOTE - PHYSICAL EXAMINATION
Vitals  T(F): 98.4 (05-25-25 @ 06:54), Max: 98.4 (05-25-25 @ 06:54)  HR: 78 (05-25-25 @ 06:54) (78 - 78)  BP: 104/66 (05-25-25 @ 06:54) (104/66 - 104/66)  RR: 18 (05-25-25 @ 06:54) (18 - 18)  SpO2: --    PHYSICAL EXAM   Gen: NAD, comfortable, AA&Ox4  HEENT: head atrumatic and normocephalic, PERRL, EOMI  CVS: +s1, s2, regular rate and rhythm, no murmurs, rubs or gallops  RESP: normal respiratory effort, clear to auscultation b/l, no wheezes/crackles/rhonchi  GI: soft, non-distended, with tenderness both lower quadrants   Extremities: no pitting edema  Neuro: answering questions appropriately, face symmetric

## 2025-05-25 NOTE — ED ADULT NURSE NOTE - NS ED NURSE PATIENT LEFT UNIT TIME
PHYSICIAN DISCHARGE SUMMARY     Patient ID:  Octaviano Croft  [id number]  63 year old  1954    Admit date: 4/5/2018    Discharge date and time:  4/9/2018     Primary Care Provider: Federico Domingo DO    Discharge Physician: Jose Luis Moreira MD    Consults:   IP CONSULT TO PHYSICIAN  IP CONSULT TO NEPHROLOGY    Primary Discharge Diagnosis:   Generalized weakness.  Acute Urinary Tract Infection (UTI).  Acute Renal Failure: Resolved.  Hyperkalemia: Resolved.    Concurrent Diagnoses:  Patient Active Problem List   Diagnosis   • Chronic ischemic heart disease, unspecified   • Obesity, unspecified   • Cerebral palsy (CMS/HCC)   • Other and unspecified hyperlipidemia   • Acute gastritis without mention of hemorrhage   • Essential hypertension   • Type II or unspecified type diabetes mellitus without mention of complication, uncontrolled   • Old myocardial infarction   • Arthrodesis status   • Congenital spondylolisthesis   • Myalgia and myositis, unspecified   • Thoracic myelopathy   • Spondylosis of unspecified site with myelopathy   • Thoracic spinal stenosis   • CAD (coronary artery disease)   • Mild mitral regurgitation   • Spinal stenosis, lumbar region, without neurogenic claudication   • Sensorimotor neuropathy   • Nuclear sclerotic cataract of both eyes   • Acute renal failure (CMS/HCC)   • Transient alteration of awareness   • Urinary tract infection without hematuria       Hospital Course: Patient was admitted to the hospital with generalized weakness. Patient was found to have an acute UTI, Acute Renal Failure & Hyperkalemia. Patient was started on IV Fluid Hydration and his Dyazide, Lisinopril & Lasix were all held. Nephrology was consulted. Patient had Renal US done that showed no acute or major abnormalities. Patient improved on these measures. Patient's Renal Function & Potassium Level both returned to Normal. Patient's urine Culture came back Negative probably due to the fact that he was on PO Antibiotics  Prior To Admission. Patient did complain of Bilateral Lower Extremity Edema and was noted to have some Exertional Tachycardia. Patient had Bilateral LE US Venous Dopplers done that showed no DVT. Patient had an Echocardiogram done that revealed an EF of 62% and other findings as noted below. Patient was given a dose of IV Lasix following which his Bilateral Lower Extremity Edema improved significantly. PT and OT evaluated patient and recommended [facility] placement. Patient was agreeable to this. On discharge, patient's Dyazide, Lisinopril & Lasix were all restarted with recommendations to get a Follow-Up BMP on 04/16/2018 with results to be sent to patient's PCP Dr. Federico Domingo or his Nursing Home MD. Patient was recommended a 10-day course of PO Antibiotic Therapy with Keflex at Discharge. Patient was discharged to a [facility] facility in a stable condition on medications, other instructions and follow-up recommendations as noted below. Patient verbalized understanding with this entire management plan and was in complete agreement. ROSALIO Dash was updated of discharge plan.     DIAGNOSTIC STUDIES      US Lower Extremity Venous Duplex Bilat   Final Result   IMPRESSION:  Normal bilateral duplex scans of the lower extremities. There   is no evidence for deep venous thrombosis.             US Renal Complete (Comp Urinary System)   Final Result   IMPRESSION:   1. Morphologically normal kidneys bilaterally. No mass lesion,   hydronephrosis, or calculi. No interval change   2. The urinary bladder is decompressed by Bolivar catheter. There are no   ureteral jets appreciated.         XR Chest PA and Lateral   Final Result   IMPRESSION:      No acute pulmonary findings.            Echo 04/09/2018  Normal left ventricular size and systolic function, EF 62 %.  Mildly increased left ventricular wall thickness.  Aortic valve sclerosis without stenosis.    Procedures         SURGERIES        Discharge Medications:     Summary of your Discharge  Medications      Take these Medications      Details   ALPRAZolam 0.25 MG tablet  Commonly known as:  XANAX   Take 1 tablet by mouth 2 times daily as needed for Anxiety.     aspirin 81 MG tablet   Take 81 mg by mouth daily. 2 tablets at night     atorvastatin 40 MG tablet  Commonly known as:  LIPITOR   Take 1 tablet by mouth daily.     B-D U/F PEN NEEDLE 5/16\" 31G X 8 MM Misc   Generic drug:  Insulin Pen Needle  USE AS DIRECTED     cephALEXin 500 MG capsule  Commonly known as:  KEFLEX   Take 1 capsule by mouth 4 times daily. Eat yogurt or take probiotic supplements on a daily basis while on antibiotic therapy.     Cinnamon 500 MG Tab   Take 1,000 mg by mouth daily. Dose: 2 Tabs (= 1,000 mg)     cyclobenzaprine 10 MG tablet  Commonly known as:  FLEXERIL   Take 1 tablet by mouth 3 times daily as needed for Muscle spasms.     DIUREX .5 MG Tab   Generic drug:  Caffeine-Magnesium Salicylate  Take 1 tablet by mouth 1 time.     Ferrous Sulfate 134 MG Tab   Take by mouth once a week.     finasteride 5 MG tablet  Commonly known as:  PROSCAR   Take 1 tablet by mouth daily.     Fish Oil 1000 MG capsule   Take 1 g by mouth 3 times daily.     gabapentin 600 MG tablet  Commonly known as:  NEURONTIN   Take 1 tablet by mouth 2 times daily.     insulin detemir 100 UNIT/ML pen-injector  Commonly known as:  LEVEMIR FLEXTOUCH   Inject 50 Units into the skin nightly.     insulin lispro 100 UNIT/ML pen-injector  Commonly known as:  HUMALOG KWIKPEN   Inject 18 Units into the skin 3 times daily (before meals).     LASIX 20 MG tablet   Generic drug:  furosemide  Take 1 tablet by mouth daily.     lisinopril 20 MG tablet  Commonly known as:  ZESTRIL   Take 1 tablet by mouth daily.     meclizine HCl 25 MG tablet  Commonly known as:  ANTIVERT   Take 1 tablet by mouth 3 times daily as needed for Dizziness.     metformin 1000 MG tablet  Commonly known as:  GLUCOPHAGE   TAKE 1 TABLET BY MOUTH TWICE DAILY WITH MEALS     metoPROLOL tartrate 25  MG tablet  Commonly known as:  LOPRESSOR   Take 1 tablet by mouth every 12 hours.     morphine SR 15 MG 12 hr tablet  Commonly known as:  MS CONTIN   Take 1 tablet by mouth 2 times daily.     Multiple Vitamin tablet   Take 1 tablet by mouth daily.     nitroGLYcerin 0.4 MG sublingual tablet  Commonly known as:  NITROSTAT   Place 1 tablet under the tongue every 5 minutes as needed for Chest pain.     nystatin 631799 UNIT/GM ointment  Commonly known as:  MYCOSTATIN   Local application of thin film to the affected area 3 times a day for next 7-10 days     omeprazole 40 MG capsule  Commonly known as:  PRILOSEC   Take 1 capsule by mouth daily.     potassium chloride 10 MEQ CR tablet   Take 1 tablet by mouth daily.     tamsulosin 0.4 MG Cap  Commonly known as:  FLOMAX   Take 2 capsules by mouth daily after a meal.     triamterene-hydrochlorothiazide 37.5-25 MG per capsule  Commonly known as:  DYAZIDE   Take 1 capsule by mouth daily.     Vitamin-B Complex Tab   Take 1 tablet by mouth 2 times daily.            Discharge Labs:    Recent Labs  Lab 04/09/18  0450 04/08/18  0507 04/07/18  0434   SODIUM 139 136 136   POTASSIUM 4.3 4.5 4.6   CHLORIDE 103 103 103   CO2 25 25 20*   BUN 19 22* 30*   CREATININE 0.75 0.85 0.97   GLUCOSE 272* 254* 235*   WBC 5.6 5.5 5.3   HGB 12.4* 12.6* 12.5*   HCT 37.6* 38.0* 37.8*    169 162       Discharge Exam:   Vital Signs:    Visit Vitals  /81 (BP Location: RUST, Patient Position: Semi-Miguel's)   Pulse 96   Temp 97.8 °F (36.6 °C) (Oral)   Resp 18   Ht 5' 7\" (1.702 m)   Wt 115.5 kg   SpO2 96%   BMI 39.88 kg/m²     General: This is a 63 year old male sitting comfortably in a recliner and appearing in no acute cardiorespiratory distress. Patient is able to speak in full sentences.  Psych: He is awake alert and oriented to time, place and person. Mood and affect are appropriate.  Head: Appears to be atraumatic and normocephalic.  Eyes: Reveal no icterus, no scleral injection, no  conjunctival pallor. Pupils   equal, round and reactive to light and accommodation.  Extraocular movements appear to be intact.  Throat: Oropharyngeal exam reveals moist oral mucosa. There is no erythema, discharge or thrush.   Neck: Supple and symmetric. There is no JVD or thyromegaly. No carotid bruits. Trachea is midline. Patient is not using his accessory muscles of respiration.   Cardio: Reveals presence of first and second heart sounds. Regular rate and rhythm. No murmurs, rubs or gallops.   Pulm: Reveals good effort and lungs are clear to auscultation   bilaterally. There are no wheezes, crackles or rhonchi.  GI:  Normoactive bowel sounds. Soft, non tender and non-distended. There is no guarding, rigidity or rebound tenderness.  There is no hepatosplenomegaly.   Extremities: No clubbing or cyanosis. 1+ bilateral lower extremity edema noted.  Neurologic: Grossly intact and moving all 4 extremities spontaneously.  Derm: Appears unremarkable and no rashes are present.  Lymph: No cervical lymphadenopathy.   Musculoskeletal: No gross deformities or abnormalities.  Heme: No bruising or bleeding noted.  Vascular: Intact and equal peripheral pulsations bilaterally.    Disposition: [facility].    Patient Instructions:   Activity: activity as tolerated, walker and assistance as needed at all times. Fall precautions. PT & OT at [facility].   Diet: cardiac diet and diabetic diet  Wound Care: as directed     Follow-Up [facility] on 04/16/2018: Send results to patient's PCP Dr. Federico Domingo or his Nursing Home MD.    Federico Domingo DO  [id number] [address] [place] [id number]  [phone]    On 4/13/2018  Hosptial follow- up with Dr. Domingo on Friday April 13, 2018 at 9:30 AM     Code status: Full Resuscitation    *Time spent on discharge was greater than 30 minutes.  All questions including discharge instructions have been discussed in detail with the patient to the best of my ability.    Jose Luis Moreira MD  4/9/2018  11:12 AM              13:07

## 2025-05-25 NOTE — ED PROVIDER NOTE - ATTENDING SHARED VISIT SELECTOR YES
Routine Prenatal Visit  [facility]  [address], [address], [place], [place]    Assessment/Plan:  Rossi Montoya is a 23y o  year old  at 36w1d who presents for routine prenatal visit  1  Diet controlled gestational diabetes mellitus (GDM) in third trimester    2  Chronic hypertension affecting pregnancy    3  36 weeks gestation of pregnancy          Subjective:     CC: Prenatal care    Lawrence Aggarwal is a 23 y o   female who presents for routine prenatal care at 36w1d  Pregnancy ROS: no leakage of fluid, pelvic pain, or vaginal bleeding   good fetal movement  GBS done today  Schedule IOL at 39 weeks    The following portions of the patient's history were reviewed and updated as appropriate: allergies, current medications, past family history, past medical history, obstetric history, gynecologic history, past social history, past surgical history and problem list       Objective:  /80   Wt 105 kg (232 lb)   LMP 2020   BMI 39 82 kg/m²   Pregravid Weight/BMI: 88 5 kg (195 lb) (BMI 33 46)  Current Weight: 105 kg (232 lb)   Total Weight Gain: 16 8 kg (37 lb)   Pre- Vitals      Most Recent Value   Prenatal Assessment   Fetal Heart Rate  138   Movement  Present   Prenatal Vitals   Blood Pressure  130/80   Weight - Scale  105 kg (232 lb)   Urine Albumin/Glucose   Dilation/Effacement/Station   Cervical Dilation  1   Cervical Effacement  50   Fetal Station  -3   Vaginal Drainage   Edema   LLE Edema  Trace   RLE Edema  Trace           General: Well appearing, no distress  Respiratory: Unlabored breathing  Cardiovascular: Regular rate  Abdomen: Soft, gravid, nontender  Fundal Height: Appropriate for gestational age  Extremities: Warm and well perfused  Non tender  You can access the FollowMyHealth Patient Portal offered by [facility] by registering at the following website: [email or web address]. By joining [employer]’s FollowMyHealth portal, you will also be able to view your health information using other applications (apps) compatible with our system. Yes

## 2025-05-25 NOTE — H&P ADULT - NSHPLABSRESULTS_GEN_ALL_CORE
LABS:                        12.9   11.32 )-----------( 387      ( 25 May 2025 07:32 )             40.2     05-    141  |  105  |  20  ----------------------------<  109[H]  4.3   |  29  |  0.96    Ca    9.5      25 May 2025 07:32    TPro  6.7  /  Alb  3.1[L]  /  TBili  0.7  /  DBili  x   /  AST  13  /  ALT  17  /  AlkPhos  67  05-25    PT/INR - ( 25 May 2025 07:32 )   PT: 13.0 sec;   INR: 1.10 ratio           Urinalysis Basic - ( 25 May 2025 10:05 )    Color: Yellow / Appearance: Clear / S.060 / pH: x  Gluc: x / Ketone: x  / Bili: Negative / Urobili: 0.2 mg/dL   Blood: x / Protein: Negative mg/dL / Nitrite: Negative   Leuk Esterase: Negative / RBC: x / WBC x   Sq Epi: x / Non Sq Epi: x / Bacteria: x       CAPILLARY BLOOD GLUCOSE            Urinalysis Basic - ( 25 May 2025 10:05 )    Color: Yellow / Appearance: Clear / S.060 / pH: x  Gluc: x / Ketone: x  / Bili: Negative / Urobili: 0.2 mg/dL   Blood: x / Protein: Negative mg/dL / Nitrite: Negative   Leuk Esterase: Negative / RBC: x / WBC x   Sq Epi: x / Non Sq Epi: x / Bacteria: x        RADIOLOGY & ADDITIONAL TESTS:    CT A/P:     Cecitis and appendicitis. Surrounding phlegmon/fluid in the right lower   quadrant without loculated collection or free air. Underlying lesion is   not excluded at the cecum. Recommend colonoscopy when clinically feasible.    Question gastritis.    Stable prominence of the main pancreatic duct at the head measuring 4-5   mm. Follow-up MRI/MRCP is suggested in 6 months for reevaluation.        EKG: NSR at 67bpm. RBBB unchanged from  ekg.    Consultant(s) Notes Reviewed:  [x ] YES  [ ] NO  Care Discussed with Consultants/Other Providers [ x] YES  [ ] NO  Imaging Personally Reviewed:  [x ] YES  [ ] NO

## 2025-05-25 NOTE — ED PROVIDER NOTE - CLINICAL SUMMARY MEDICAL DECISION MAKING FREE TEXT BOX
63 yo M of D who is here for abdominal pain. Reports abdominal pain, epigastric area after breakfast, constant then resolved. Then had right lower abdomen pain on palpitation. Denies nausea, vomiting, diarrhea. Last BM yesterday.     denies urinary symptoms, testicular pain or swelling   denies any previous intraabdominal surgeries.    plan for ekg, cbc,cmp, CT c/p, lipase, ua 65 yo M of [facility] who is here for abdominal pain. Reports abdominal pain, epigastric area after breakfast, constant then resolved. Then had right lower abdomen pain on palpitation. Denies nausea, vomiting, diarrhea. Last BM yesterday.     denies urinary symptoms, testicular pain or swelling   denies any previous intraabdominal surgeries.    plan for ekg, cbc,cmp, CT c/p, lipase, ua    CT abdomen shows appendicitis, ceitis and possible gastric, called surgery, Dr. Joseph went to see pt, plan for OR in the afternoon  spoke with Dr. rOdaz, admitting hospitalist, accepted

## 2025-05-25 NOTE — BRIEF OPERATIVE NOTE - NSICDXBRIEFPREOP_GEN_ALL_CORE_FT
PRE-OP DIAGNOSIS:  Appendicitis, acute 25-May-2025 14:40:07  Jose Joseph  Colitis, acute 25-May-2025 14:40:21  Jose Joseph

## 2025-05-25 NOTE — ED PROVIDER NOTE - NS ED ATTENDING STATEMENT MOD
I have seen and examined this patient and fully participated in the care of this patient as the teaching attending.  The service was shared with the ROBERTA.  I reviewed and verified the documentation.

## 2025-05-25 NOTE — CONSULT NOTE ADULT - SUBJECTIVE AND OBJECTIVE BOX
ED provider note:    · Chief Complaint: The patient is a 64y Male complaining of abdominal pain.  · HPI Objective Statement: 63 yo M of [facility] who is here for abdominal pain. Reports abdominal pain, epigastric area after breakfast, constant then resolved. Then had right lower abdomen pain on palpitation. Denies nausea, vomiting, diarrhea. Last BM yesterday.     	denies urinary symptoms, testicular pain or swelling   denies any previous intraabdominal surgeries.              my note:    patient interviewed and examined in ED [place]#6 with wife present    epigastric to rlq pain    had colonsocopy in past few years    has abdominal wall surgery ? cyst    in mild discomfort    afebrile    vitals stable    Heent-sclera anicteric    abdomen-soft, non distended, 2+/4+ rlq tenderness    labs:    wbc 11  h/h 12/40  plt  387    k+4.3    creat  0.96    imaging(I personally reviewed);  < from: CT Abdomen and Pelvis w/ IV Cont (05.25.25 @ 08:23) >    BOWEL: Stomach is underdistended. Mildly thickened rugal folds of the   stomach. No small bowel distention. Mild stool burden of the colon limits   evaluation of the colonic mucosa. Prominent cecal wall thickening, image   83 series 2. The appendix is also thickened at the base/mid segments up   to 12 mm, subsequently tapering at the tip, images 86-90 series 2.   Surrounding phlegmon/fluid is noted in the right lower quadrant without   loculated collection or free air.  PERITONEUM/RETROPERITONEUM: Trace fluid and prominent inflammation of the   right lower quadrant. No loculated collection or free air.  VESSELS: No abdominal aortic aneurysm. Aortoiliac calcified plaque.  LYMPH NODES: Small volume nodes not enlarged by CT size criteria  ABDOMINAL WALL: Postoperative changes. Tiny fat-containing left inguinal   hernia.  BONES: Degenerative changes of the bones including multilevel spinal   spondylosis. Central canal and neural foramina are not adequately   assessed on this study. Bilateral hip prostheses, with surrounding streak   artifact.    IMPRESSION:    Cecitis and appendicitis. Surrounding phlegmon/fluid in the right lower   quadrant without loculated collection or free air. Underlying lesion is   not excluded at the cecum. Recommend colonoscopy when clinically feasible.    Question gastritis.    Stable prominence of the main pancreatic duct at the head measuring 4-5   mm. Follow-up MRI/MRCP is suggested in 6 months for reevaluation.    --- End of Report ---            GIRISH AYALA M.D., ATTENDING RADIOLOGIST  This document has been electronically signed. May 25 2025  8:53AM    < end of copied text >

## 2025-05-25 NOTE — ED ADULT NURSE NOTE - NS ED NURSE REPORT GIVEN DT
[facility] ambulatory encounter  FAMILY PRACTICE OFFICE VISIT    CHIEF COMPLAINT:    Chief Complaint   Patient presents with   • Eye Problem     pain, red both eye 1 week        SUBJECTIVE:  Deyvi Serrano is a 67 year old male who presented requesting evaluation for bilateral ocular swelling, itching, and redness for 10 days.  Increased nasal rhinorrhea, sneezing, and scratchy cough.  Mild increased wheezing, good relief with inhaler.  No purulent ocular discharge.    Diabetes much better control, FBS     He continues struggling with regional hives, negative workup by allergist... he benefits tremendously from one dose of oral prednisone 20 mg, and reaction resolves.  His last A1C was 12.6, but he has improved since.    Review of systems:   Constitutional: Negative for fever and chills.   Skin: as above.  HEENT: as above.  Respiratory: as above.     OBJECTIVE:  PROBLEM LIST:   Patient Active Problem List   Diagnosis   • Major depressive disorder, recurrent episode, severe, without mention of psychotic behavior   • Panic disorder without agoraphobia   • Atrial fibrillation with controlled ventricular response (CMS/HCC)   • Warfarin anticoagulation   • Neck pain on left side   • Left sided sciatica   • Cervical radicular pain   • Pain in thoracic spine   • Segmental dysfunction of lumbar region   • Segmental dysfunction of cervical region   • Segmental dysfunction of thoracic region   • Segmental dysfunction of sacral region   • Coronary artery disease involving native heart with angina pectoris (CMS/HCC)   • Presence of stent in coronary artery   • Type 2 diabetes mellitus, uncontrolled (CMS/HCC)   • Essential hypertension with goal blood pressure less than 130/80   • Pain in joint of left shoulder region   • Biceps tendonitis on left   • Pure hyperglyceridemia   • Arthralgia of left acromioclavicular joint   • Left medial knee pain   • Encounter for therapeutic drug monitoring [Z51.81]   • Long term (current)  use of anticoagulants [Z79.01]   • Generalized anxiety disorder   • Angioedema   • Allergic dermatitis       PAST HISTORIES:   I have reviewed the past medical history, family history, social history, medications and allergies listed in the medical record as obtained by my nursing staff and support staff and agree with their documentation.    PHYSICAL EXAM:   Vital Signs:    Visit Vitals  /84 (BP Location: LUE - Left upper extremity, Patient Position: Sitting, Cuff Size: Large Adult)   Pulse 84   Temp 98.2 °F (36.8 °C) (Oral)   Resp 16   Ht 5' 7\" (1.702 m)   Wt 98 kg   SpO2 97%   BMI 33.84 kg/m²     Pulse Ox Interpretation:  Within normal limits.  General:   Alert, cooperative, conversive in no acute distress.  Skin:  Warm and dry without rash.    Head:  Normocephalic, atraumatic.   Neck:  Trachea is midline.  Supple, no nodes.  Eyes:  Conjunctival injection bilaterally.  ENT:  Mucous membranes are moist.  Throat with PND, no exudate.  Cardiovascular:  Symmetrical pulses.   Respiratory:  Normal respiratory effort. No wheezing    LAB RESULTS:   Lab Results   Component Value Date    SODIUM 142 06/14/2021    POTASSIUM 4.0 06/14/2021    CHLORIDE 111 (H) 06/14/2021    CO2 24 06/14/2021    BUN 18 06/14/2021    CREATININE 0.96 06/14/2021    GLUCOSE 107 (H) 06/14/2021    HGBA1C 9.0 (H) 06/14/2021    URMIC 4.54 08/21/2019    .00 02/23/2021    MALBCR 75.0 (H) 02/23/2021       ASSESSMENT:   1. Uncontrolled type 2 diabetes mellitus with hypoglycemia without coma (CMS/[facility])    2. Essential hypertension with goal blood pressure less than 130/80    3. Routine general medical examination at a health care facility    4. Allergic conjunctivitis of both eyes and rhinitis        PLAN:   Orders Placed This Encounter   • Glycohemoglobin   • Lipid Panel With Reflex   • Thyroid Stimulating Hormone Reflex   • PSA   • Comprehensive Metabolic Panel   • CBC with Automated Differential   • olopatadine (PATADAY) 0.2 % ophthalmic  solution   • montelukast (Singulair) 10 MG tablet   • predniSONE (DELTASONE) 20 MG tablet     Order labs, recheck diabetes.  Continue healthy diet.  Start eye gtts.  Add singulair.    No follow-ups on file.    Instructions provided as documented in the after visit summary.    The patient indicated understanding of the diagnosis and agreed with the plan of care.      25-May-2025 13:07

## 2025-05-25 NOTE — ED PROVIDER NOTE - ATTENDING CONTRIBUTION TO CARE
63 [facility] M of [facility] [place] who is here for abdominal pain. Reports abdominal pain, epigastric area after breakfast, constant then resolved. Then had right lower abdomen pain on palpitation. Denies nausea, vomiting, diarrhea. Last BM yesterday.     denies urinary symptoms, testicular pain or swelling   denies any previous intraabdominal surgeries.    plan for ekg, cbc,cmp, CT c/p, lipase, ua    CT abdomen shows appendicitis, ceitis and possible gastric, called surgery, Dr. Joseph went to see pt, plan for OR in the afternoon  spoke with Dr. Ordaz, admitting hospitalist, accepted  Dr. Rosales:  I have reviewed and discussed with the PA/ resident the case specifics, including the history, physical assessment, evaluation, conclusion, laboratory results, and medical plan. I agree with the contents, and conclusions. I have personally examined, and interviewed the patient.

## 2025-05-25 NOTE — ED PROVIDER NOTE - NS ED ROS FT
Review of Symptoms  Gen: no fevers, chills  Visual: no recent changes in vision  Cardiovascular: no chest pain, no palpitations, no orthopnea, no leg swelling  Respiratory: no shortness of breath  GI: no difficulty swallowing, no nausea, no vomiting, with abdominal pain, no diarrhea, no constipation, no melana  : no dysuria, no increased freq, no hematuria, no malodorous urine  Derm: no wounds, no rashes  Heme: no easy bleeding or bruising  MSK: no joint pain, no joint swelling or redness, no extremity pain   Neuro: no headache, no numbness, no weakness, no memory loss

## 2025-05-26 ENCOUNTER — TRANSCRIPTION ENCOUNTER (OUTPATIENT)
Age: 65
End: 2025-05-26

## 2025-05-26 VITALS
TEMPERATURE: 98 F | OXYGEN SATURATION: 95 % | HEART RATE: 69 BPM | RESPIRATION RATE: 17 BRPM | SYSTOLIC BLOOD PRESSURE: 118 MMHG | DIASTOLIC BLOOD PRESSURE: 66 MMHG

## 2025-05-26 LAB
ANION GAP SERPL CALC-SCNC: 10 MMOL/L — SIGNIFICANT CHANGE UP (ref 5–17)
BASOPHILS # BLD AUTO: 0.01 K/UL — SIGNIFICANT CHANGE UP (ref 0–0.2)
BASOPHILS NFR BLD AUTO: 0.1 % — SIGNIFICANT CHANGE UP (ref 0–2)
BUN SERPL-MCNC: 16 MG/DL — SIGNIFICANT CHANGE UP (ref 7–23)
CALCIUM SERPL-MCNC: 8.8 MG/DL — SIGNIFICANT CHANGE UP (ref 8.4–10.5)
CHLORIDE SERPL-SCNC: 106 MMOL/L — SIGNIFICANT CHANGE UP (ref 96–108)
CO2 SERPL-SCNC: 26 MMOL/L — SIGNIFICANT CHANGE UP (ref 22–31)
CREAT SERPL-MCNC: 0.81 MG/DL — SIGNIFICANT CHANGE UP (ref 0.5–1.3)
EGFR: 99 ML/MIN/1.73M2 — SIGNIFICANT CHANGE UP
EGFR: 99 ML/MIN/1.73M2 — SIGNIFICANT CHANGE UP
EOSINOPHIL # BLD AUTO: 0.01 K/UL — SIGNIFICANT CHANGE UP (ref 0–0.5)
EOSINOPHIL NFR BLD AUTO: 0.1 % — SIGNIFICANT CHANGE UP (ref 0–6)
GLUCOSE SERPL-MCNC: 100 MG/DL — HIGH (ref 70–99)
HCT VFR BLD CALC: 36.2 % — LOW (ref 39–50)
HGB BLD-MCNC: 11.7 G/DL — LOW (ref 13–17)
IMM GRANULOCYTES NFR BLD AUTO: 0.3 % — SIGNIFICANT CHANGE UP (ref 0–0.9)
LYMPHOCYTES # BLD AUTO: 1.7 K/UL — SIGNIFICANT CHANGE UP (ref 1–3.3)
LYMPHOCYTES # BLD AUTO: 15.2 % — SIGNIFICANT CHANGE UP (ref 13–44)
MCHC RBC-ENTMCNC: 27.1 PG — SIGNIFICANT CHANGE UP (ref 27–34)
MCHC RBC-ENTMCNC: 32.3 G/DL — SIGNIFICANT CHANGE UP (ref 32–36)
MCV RBC AUTO: 83.8 FL — SIGNIFICANT CHANGE UP (ref 80–100)
MONOCYTES # BLD AUTO: 0.69 K/UL — SIGNIFICANT CHANGE UP (ref 0–0.9)
MONOCYTES NFR BLD AUTO: 6.2 % — SIGNIFICANT CHANGE UP (ref 2–14)
NEUTROPHILS # BLD AUTO: 8.76 K/UL — HIGH (ref 1.8–7.4)
NEUTROPHILS NFR BLD AUTO: 78.1 % — HIGH (ref 43–77)
NRBC BLD AUTO-RTO: 0 /100 WBCS — SIGNIFICANT CHANGE UP (ref 0–0)
PLATELET # BLD AUTO: 382 K/UL — SIGNIFICANT CHANGE UP (ref 150–400)
POTASSIUM SERPL-MCNC: 4 MMOL/L — SIGNIFICANT CHANGE UP (ref 3.5–5.3)
POTASSIUM SERPL-SCNC: 4 MMOL/L — SIGNIFICANT CHANGE UP (ref 3.5–5.3)
RBC # BLD: 4.32 M/UL — SIGNIFICANT CHANGE UP (ref 4.2–5.8)
RBC # FLD: 14.4 % — SIGNIFICANT CHANGE UP (ref 10.3–14.5)
SODIUM SERPL-SCNC: 142 MMOL/L — SIGNIFICANT CHANGE UP (ref 135–145)
WBC # BLD: 11.2 K/UL — HIGH (ref 3.8–10.5)
WBC # FLD AUTO: 11.2 K/UL — HIGH (ref 3.8–10.5)

## 2025-05-26 PROCEDURE — 96375 TX/PRO/DX INJ NEW DRUG ADDON: CPT

## 2025-05-26 PROCEDURE — 81003 URINALYSIS AUTO W/O SCOPE: CPT

## 2025-05-26 PROCEDURE — 85025 COMPLETE CBC W/AUTO DIFF WBC: CPT

## 2025-05-26 PROCEDURE — C9399: CPT

## 2025-05-26 PROCEDURE — 88304 TISSUE EXAM BY PATHOLOGIST: CPT

## 2025-05-26 PROCEDURE — 93005 ELECTROCARDIOGRAM TRACING: CPT

## 2025-05-26 PROCEDURE — 36415 COLL VENOUS BLD VENIPUNCTURE: CPT

## 2025-05-26 PROCEDURE — 99239 HOSP IP/OBS DSCHRG MGMT >30: CPT

## 2025-05-26 PROCEDURE — 99285 EMERGENCY DEPT VISIT HI MDM: CPT

## 2025-05-26 PROCEDURE — 83690 ASSAY OF LIPASE: CPT

## 2025-05-26 PROCEDURE — 96374 THER/PROPH/DIAG INJ IV PUSH: CPT

## 2025-05-26 PROCEDURE — C1889: CPT

## 2025-05-26 PROCEDURE — 74177 CT ABD & PELVIS W/CONTRAST: CPT

## 2025-05-26 PROCEDURE — 80048 BASIC METABOLIC PNL TOTAL CA: CPT

## 2025-05-26 PROCEDURE — 80053 COMPREHEN METABOLIC PANEL: CPT

## 2025-05-26 PROCEDURE — 85610 PROTHROMBIN TIME: CPT

## 2025-05-26 RX ORDER — ROSUVASTATIN CALCIUM 20 MG/1
1 TABLET, FILM COATED ORAL
Refills: 0 | DISCHARGE

## 2025-05-26 RX ORDER — ACETAMINOPHEN 500 MG/5ML
3 LIQUID (ML) ORAL
Qty: 0 | Refills: 0 | DISCHARGE
Start: 2025-05-26

## 2025-05-26 RX ORDER — AMOXICILLIN AND CLAVULANATE POTASSIUM 500; 125 MG/1; MG/1
1 TABLET, FILM COATED ORAL
Qty: 10 | Refills: 0
Start: 2025-05-26 | End: 2025-05-30

## 2025-05-26 RX ADMIN — Medication 25 GRAM(S): at 03:15

## 2025-05-26 RX ADMIN — Medication 400 MILLIGRAM(S): at 05:30

## 2025-05-26 RX ADMIN — Medication 400 MILLIGRAM(S): at 08:11

## 2025-05-26 RX ADMIN — ENOXAPARIN SODIUM 40 MILLIGRAM(S): 100 INJECTION SUBCUTANEOUS at 05:32

## 2025-05-26 NOTE — DISCHARGE NOTE NURSING/CASE MANAGEMENT/SOCIAL WORK - PATIENT PORTAL LINK FT
You can access the FollowMyHealth Patient Portal offered by [facility] by registering at the following website: [email or web address]. By joining [facility]’s FollowMyHealth portal, you will also be able to view your health information using other applications (apps) compatible with our system.

## 2025-05-26 NOTE — DISCHARGE NOTE NURSING/CASE MANAGEMENT/SOCIAL WORK - FINANCIAL ASSISTANCE
[facility] provides services at a reduced cost to those who are determined to be eligible through [facility]’s financial assistance program. Information regarding [facility]’s financial assistance program can be found by going to [email or web address] or by calling [phone].

## 2025-05-26 NOTE — PROGRESS NOTE ADULT - NS ATTEND AMEND GEN_ALL_CORE FT
64 year old male with past medical history of HLD, who presented on 5/25 for annika-umbilical pain and chills. In the ED, he was afebrile and hemodynamically stable. Labs on admission notable for WBC 11.3. CT abd/pelvis showed Cecitis and appendicitis. Surrounding phlegmon/fluid in the right lower quadrant without loculated collection or free air. Also noted stable prominence of the main pancreatic duct at the head measuring 4-5 mm.  He was seen by surgery, s/p laparoscopic appendectomy w/ intra-op finding of very inflamed and thickened appendix with cecal inflammation. POD1    Patient seen and examined at bedside. No acute events noted.  Endorses abdominal soreness which his expected. No N/V, tolerating oral. Ambulated around unit w/ wife this morning.     PHYSICAL EXAM:  GENERAL: NAD  HEENT: NCAT  NECK: Supple, No JVD  CHEST/LUNG: Clear to percussion bilaterally; No rales, rhonchi, wheezing  HEART: Regular rate and rhythm; No murmurs  ABDOMEN: Soft, Nondistended; Bowel sounds present  Lap incisions with steri strips in place, c/d/i  MUSCULOSKELETAL/EXTREMITIES:  2+ Peripheral Pulses, No LE edema  PSYCH: Appropriate affect  NEURO: AAO x 3, nonfocal    Labs reviewed, WBC 11.2 likely reactive, Hb 11.7 (from 12.9)    Agree with plan as above  Patient admitted with acute appendicitis s/p lap appendectomy, POD 1, doing well.   Stable for d/c with 5 days of augmentin per surgery and follow up in office on Thursday.  Pain control PRN  He also has CT finding of cecitis likely due to intra-op finding of very inflamed and thickened appendix with cecal inflammation. Will follow up with Dr. Joseph in office.    He has known prominence of the main pancreatic duct at the head measuring 4-5   mm - stable on CT scan. Follows with Dr. Acevedo.     Rest of plan as above    Plan of care discussed with patient and patient's wife at bedside.   Patient's PMD Dr. Reyes was updated

## 2025-05-26 NOTE — DISCHARGE NOTE PROVIDER - NSDCMRMEDTOKEN_GEN_ALL_CORE_FT
acetaminophen 325 mg oral tablet: 3 tab(s) orally every 6 hours as needed for  moderate pain  amoxicillin-clavulanate 875 mg-125 mg oral tablet: 1 tab(s) orally 2 times a day  Wegovy (1.7 mg dose) subcutaneous solution: 1.7 milligram(s) subcutaneously

## 2025-05-26 NOTE — DISCHARGE NOTE NURSING/CASE MANAGEMENT/SOCIAL WORK - NSDCPEFALRISK_GEN_ALL_CORE
For information on Fall & Injury Prevention, visit: [email or web address] OR  [email or web address] OR  https://www.cdc.gov/steadi/patient.html

## 2025-05-26 NOTE — DISCHARGE NOTE PROVIDER - NSDCFUSCHEDAPPT_GEN_ALL_CORE_FT
Reyes, [facility]  [address]  Scheduled Appointment: 06/30/2025

## 2025-05-26 NOTE — PROGRESS NOTE ADULT - ASSESSMENT
a/p    doing well    advance diet      d/c home today on po augmentin [id number]    f/u in my office thursday    thank you    dr scar sheikh  cell#[phone]
65yo male with PMHx of HLD, hx of obesity being admitted to [facility] with acute appendicitis.     #Acute Appendicitis  #POD #1, Lap Appe  -CT reviewed as above  -Surgery following, Dr. Joseph, f/u with him in office on Thursday, 5/29  -pt with no fevers, wbc 11  -D/C home today on Augmentin for 5 days    #HLD  -continue statin    #Overweight  #hx of obesity  -continue on wegovy at home    #GOC - full code    Dispo:  D/C home, f/u with Surgery this week, wife in room and updated.

## 2025-05-26 NOTE — PROGRESS NOTE ADULT - SUBJECTIVE AND OBJECTIVE BOX
Patient is a 64y old  Male who presents with a chief complaint of abdominal pain (26 May 2025 07:17)      Patient seen and examined at bedside. No overnight events reported.   Pt tolerating diet, eager to go home.     ALLERGIES:  No Known Allergies    MEDICATIONS  (STANDING):  acetaminophen     Tablet .. 975 milliGRAM(s) Oral every 6 hours  acetaminophen   IVPB .. 1000 milliGRAM(s) IV Intermittent every 6 hours  enoxaparin Injectable 40 milliGRAM(s) SubCutaneous every 24 hours  lactated ringers. 1000 milliLiter(s) (100 mL/Hr) IV Continuous <Continuous>  piperacillin/tazobactam IVPB.. 3.375 Gram(s) IV Intermittent every 8 hours    MEDICATIONS  (PRN):  ondansetron Injectable 4 milliGRAM(s) IV Push every 6 hours PRN Nausea and/or Vomiting  oxyCODONE    IR 5 milliGRAM(s) Oral every 6 hours PRN Moderate Pain (4 - 6)  oxyCODONE    IR 10 milliGRAM(s) Oral every 6 hours PRN Severe Pain (7 - 10)    Vital Signs Last 24 Hrs  T(F): 97.9 (26 May 2025 05:53), Max: 97.9 (26 May 2025 02:30)  HR: 59 (26 May 2025 05:53) (58 - 74)  BP: 103/59 (26 May 2025 05:53) (103/59 - 123/78)  RR: 16 (26 May 2025 05:53) (14 - 20)  SpO2: 94% (26 May 2025 05:53) (92% - 99%)  I&O's Summary    25 May 2025 07:01  -  26 May 2025 07:00  --------------------------------------------------------  IN: 150 mL / OUT: 800 mL / NET: -650 mL      PHYSICAL EXAM:  General: NAD, A/O x 3  ENT: No gross hearing impairment, Moist mucous membranes, no thrush  Neck: Supple, No JVD  Lungs: Clear to auscultation bilaterally, good air entry, non-labored breathing  Cardio: RRR, S1/S2, No murmur  Abdomen: Soft, wounds clean and dry, Nontender, Nondistended; Bowel sounds present  Extremities: No calf tenderness, No cyanosis, No pitting edema  Psych: Appropriate mood and affect    LABS:                        11.7   11.20 )-----------( 382      ( 26 May 2025 07:25 )             36.2     05-26    142  |  106  |  16  ----------------------------<  100  4.0   |  26  |  0.81    Ca    8.8      26 May 2025 07:25    TPro  6.7  /  Alb  3.1  /  TBili  0.7  /  DBili  x   /  AST  13  /  ALT  17  /  AlkPhos  67  05-25      Lipase: 23 U/L (05-25-25 @ 07:32)      PT/INR - ( 25 May 2025 07:32 )   PT: 13.0 sec;   INR: 1.10 ratio                                     Urinalysis Basic - ( 26 May 2025 07:25 )    Color: x / Appearance: x / SG: x / pH: x  Gluc: 100 mg/dL / Ketone: x  / Bili: x / Urobili: x   Blood: x / Protein: x / Nitrite: x   Leuk Esterase: x / RBC: x / WBC x   Sq Epi: x / Non Sq Epi: x / Bacteria: x          RADIOLOGY & ADDITIONAL TESTS:  < from: CT Abdomen and Pelvis w/ IV Cont (05.25.25 @ 08:23) >      IMPRESSION:    Cecitis and appendicitis. Surrounding phlegmon/fluid in the right lower   quadrant without loculated collection or free air. Underlying lesion is   not excluded at the cecum. Recommend colonoscopy when clinically feasible.    Question gastritis.    Stable prominence of the main pancreatic duct at the head measuring 4-5   mm. Follow-up MRI/MRCP is suggested in 6 months for reevaluation.      < end of copied text >    Care Discussed with Consultants/Other Providers:    Patient is a 64y old  Male who presents with a chief complaint of abdominal pain (26 May 2025 07:17)    Patient seen and examined at bedside. No overnight events reported.   Pt tolerating diet, eager to go home.     ALLERGIES:  No Known Allergies    MEDICATIONS  (STANDING):  acetaminophen     Tablet .. 975 milliGRAM(s) Oral every 6 hours  acetaminophen   IVPB .. 1000 milliGRAM(s) IV Intermittent every 6 hours  enoxaparin Injectable 40 milliGRAM(s) SubCutaneous every 24 hours  lactated ringers. 1000 milliLiter(s) (100 mL/Hr) IV Continuous <Continuous>  piperacillin/tazobactam IVPB.. 3.375 Gram(s) IV Intermittent every 8 hours    MEDICATIONS  (PRN):  ondansetron Injectable 4 milliGRAM(s) IV Push every 6 hours PRN Nausea and/or Vomiting  oxyCODONE    IR 5 milliGRAM(s) Oral every 6 hours PRN Moderate Pain (4 - 6)  oxyCODONE    IR 10 milliGRAM(s) Oral every 6 hours PRN Severe Pain (7 - 10)    Vital Signs Last 24 Hrs  T(F): 97.9 (26 May 2025 05:53), Max: 97.9 (26 May 2025 02:30)  HR: 59 (26 May 2025 05:53) (58 - 74)  BP: 103/59 (26 May 2025 05:53) (103/59 - 123/78)  RR: 16 (26 May 2025 05:53) (14 - 20)  SpO2: 94% (26 May 2025 05:53) (92% - 99%)  I&O's Summary    25 May 2025 07:01  -  26 May 2025 07:00  --------------------------------------------------------  IN: 150 mL / OUT: 800 mL / NET: -650 mL    PHYSICAL EXAM:  General: NAD, A/O x 3  ENT: No gross hearing impairment, Moist mucous membranes, no thrush  Neck: Supple, No JVD  Lungs: Clear to auscultation bilaterally, good air entry, non-labored breathing  Cardio: RRR, S1/S2, No murmur  Abdomen: Soft, wounds clean and dry, Nontender, Nondistended; Bowel sounds present  Extremities: No calf tenderness, No cyanosis, No pitting edema  Psych: Appropriate mood and affect    LABS:                        11.7   11.20 )-----------( 382      ( 26 May 2025 07:25 )             36.2     05-26    142  |  106  |  16  ----------------------------<  100  4.0   |  26  |  0.81    Ca    8.8      26 May 2025 07:25    TPro  6.7  /  Alb  3.1  /  TBili  0.7  /  DBili  x   /  AST  13  /  ALT  17  /  AlkPhos  67  05-25      Lipase: 23 U/L (05-25-25 @ 07:32)      PT/INR - ( 25 May 2025 07:32 )   PT: 13.0 sec;   INR: 1.10 ratio      Urinalysis Basic - ( 26 May 2025 07:25 )    Color: x / Appearance: x / SG: x / pH: x  Gluc: 100 mg/dL / Ketone: x  / Bili: x / Urobili: x   Blood: x / Protein: x / Nitrite: x   Leuk Esterase: x / RBC: x / WBC x   Sq Epi: x / Non Sq Epi: x / Bacteria: x    RADIOLOGY & ADDITIONAL TESTS:  < from: CT Abdomen and Pelvis w/ IV Cont (05.25.25 @ 08:23) >      IMPRESSION:    Cecitis and appendicitis. Surrounding phlegmon/fluid in the right lower   quadrant without loculated collection or free air. Underlying lesion is   not excluded at the cecum. Recommend colonoscopy when clinically feasible.    Question gastritis.    Stable prominence of the main pancreatic duct at the head measuring 4-5   mm. Follow-up MRI/MRCP is suggested in 6 months for reevaluation.      < end of copied text >    Care Discussed with Consultants/Other Providers:

## 2025-05-26 NOTE — DISCHARGE NOTE PROVIDER - HOSPITAL COURSE
HPI:  65yo male with PMHx of HLD presenting to the ED with c/o abdominal pain. Pt states that he started with per-umbilical abdominal pain/pressure yesterday morning after breakfast. Over the day the pain subsided. Tolerated lunch, and developed chills in the evening. Went to bed and upon wakening the pain had migrated to the right lower abdomen. Denies cp, sob, palp, ha, fever or other complaints.  (25 May 2025 10:05)    Hospital Course Summary:  Pt admitted to medicine, had Surgery consult, Dr Joseph.  Pt had a Lap Appe on 5/25, did well post-op, labs stable.  D/C home and f/u in 3 days with Surgery.  < from: CT Abdomen and Pelvis w/ IV Cont (05.25.25 @ 08:23) >      IMPRESSION:    Cecitis and appendicitis. Surrounding phlegmon/fluid in the right lower   quadrant without loculated collection or free air. Underlying lesion is   not excluded at the cecum. Recommend colonoscopy when clinically feasible.    Question gastritis.    Stable prominence of the main pancreatic duct at the head measuring 4-5   mm. Follow-up MRI/MRCP is suggested in 6 months for reevaluation.    --- End of Report ---    < end of copied text >      Source of Infection:  appendix  Antibiotic / Last Day:  Augmentin x 5 days    Discharging Provider: TAB Kirk  Contact Info: [phone]  please call with any questions or concerns.         HPI:  63yo male with PMHx of HLD presenting to the ED with c/o abdominal pain. Pt states that he started with per-umbilical abdominal pain/pressure yesterday morning after breakfast. Over the day the pain subsided. Tolerated lunch, and developed chills in the evening. Went to bed and upon wakening the pain had migrated to the right lower abdomen. Denies cp, sob, palp, ha, fever or other complaints.  (25 May 2025 10:05)    Hospital Course Summary:  Pt admitted to medicine, had Surgery consult, Dr Joseph.  Pt had a Lap Appe on 5/25, did well post-op, labs stable.  D/C home and f/u in 3 days with Surgery.  < from: CT Abdomen and Pelvis w/ IV Cont (05.25.25 @ 08:23) >      IMPRESSION:    Cecitis and appendicitis. Surrounding phlegmon/fluid in the right lower   quadrant without loculated collection or free air. Underlying lesion is   not excluded at the cecum. Recommend colonoscopy when clinically feasible.    Question gastritis.    Stable prominence of the main pancreatic duct at the head measuring 4-5   mm. Follow-up MRI/MRCP is suggested in 6 months for reevaluation.    --- End of Report ---    < end of copied text >      Source of Infection:  appendix  Antibiotic / Last Day:  Augmentin x 5 days    Discharging Provider: TAB Kirk  Contact Info: [phone]  please call with any questions or concerns.    Patient's PMD Dr Reyes updated

## 2025-05-26 NOTE — DISCHARGE NOTE PROVIDER - NSDCCPCAREPLAN_GEN_ALL_CORE_FT
PRINCIPAL DISCHARGE DIAGNOSIS  Diagnosis: Acute appendicitis  Assessment and Plan of Treatment:   -You had surgery to remove your appendix with Dr Joseph  -You had an incidental finding on CAT scan of prominence around Pancreatic duct measuring 4-5 mm, follow up with Dr Joseph to determine if you will need a follow up MRI in 6 months  -You are being prescribed antibiotics for 5 days  -take tylenol for pain as needed  -follow up with your PCP within 1-2 weeks

## 2025-05-26 NOTE — DISCHARGE NOTE PROVIDER - CARE PROVIDER_API CALL
Jose Joseph  Surgery  [address] [place] [address], [address]  [place] [address]  Phone: [phone]  Fax: [phone]  Follow Up Time:

## 2025-05-28 ENCOUNTER — NON-APPOINTMENT (OUTPATIENT)
Age: 65
End: 2025-05-28

## 2025-06-03 ENCOUNTER — APPOINTMENT (OUTPATIENT)
Dept: SURGERY | Facility: CLINIC | Age: 65
End: 2025-06-03
Payer: COMMERCIAL

## 2025-06-03 DIAGNOSIS — K35.32 ACUTE APPENDICITIS W/ PERFORATION AND LOCALIZED PERITONITIS, W/O ABSCESS: ICD-10-CM

## 2025-06-03 PROCEDURE — 99024 POSTOP FOLLOW-UP VISIT: CPT

## 2025-06-05 LAB — SURGICAL PATHOLOGY STUDY: SIGNIFICANT CHANGE UP

## 2025-06-17 ENCOUNTER — APPOINTMENT (OUTPATIENT)
Dept: SURGERY | Facility: CLINIC | Age: 65
End: 2025-06-17
Payer: COMMERCIAL

## 2025-06-17 PROCEDURE — 99024 POSTOP FOLLOW-UP VISIT: CPT

## 2025-07-08 ENCOUNTER — APPOINTMENT (OUTPATIENT)
Age: 65
End: 2025-07-08
Payer: COMMERCIAL

## 2025-07-08 VITALS
RESPIRATION RATE: 16 BRPM | SYSTOLIC BLOOD PRESSURE: 108 MMHG | WEIGHT: 171 LBS | DIASTOLIC BLOOD PRESSURE: 71 MMHG | HEIGHT: 70 IN | TEMPERATURE: 97.2 F | OXYGEN SATURATION: 95 % | HEART RATE: 70 BPM | BODY MASS INDEX: 24.48 KG/M2

## 2025-07-08 PROCEDURE — 99214 OFFICE O/P EST MOD 30 MIN: CPT

## (undated) DEVICE — LIGASURE BLUNT TIP 5MM X 37CM

## (undated) DEVICE — GLV COTTON DEROYAL XL

## (undated) DEVICE — SPECIMEN CONTAINER PET

## (undated) DEVICE — TROCAR COVIDIEN VERSASTEP 5MM STANDARD

## (undated) DEVICE — PREP DURAPREP 26CC

## (undated) DEVICE — SUT POLYSORB 4-0 30" C-13 UNDYED

## (undated) DEVICE — TROCAR COVIDIEN VERSASTEP PLUS 11MM STANDARD

## (undated) DEVICE — GLV 8.5 PROTEXIS (WHITE)

## (undated) DEVICE — VENODYNE/SCD SLEEVE CALF MEDIUM

## (undated) DEVICE — LAP PAD 18 X 18"

## (undated) DEVICE — STAPLER COVIDIEN ENDO GIA STANDARD HANDLE

## (undated) DEVICE — SUT VICRYL PLUS 0 27" CT-3

## (undated) DEVICE — CANISTER SUCTION LID GUARD 3000CC

## (undated) DEVICE — POSITIONER FOAM HEAD CRADLE (PINK)

## (undated) DEVICE — DRAPE TOWEL BLUE 17" X 24"

## (undated) DEVICE — SOL IRR POUR H2O 1500ML

## (undated) DEVICE — DISSECTOR ENDOSCOPIC KITTNER SINGLE TIP

## (undated) DEVICE — DRSG STERISTRIPS 0.5 X 4"

## (undated) DEVICE — POSITIONER STRAP ARMBOARD VELCRO TS-30

## (undated) DEVICE — TROCAR COVIDIEN VERSASTEP PLUS 12MM STANDARD

## (undated) DEVICE — ENDOCATCH 10MM

## (undated) DEVICE — VENODYNE/SCD SLEEVE CALF LARGE

## (undated) DEVICE — INSUFFLATION NDL COVIDIEN STEP 14G FOR STEP/VERSASTEP

## (undated) DEVICE — SOL ANTI FOG (FRED)

## (undated) DEVICE — SOL IRR POUR NS 0.9% 500ML

## (undated) DEVICE — WARMING BLANKET UPPER ADULT

## (undated) DEVICE — TUBING W FILTER STERILE FOR INSUFFLATION

## (undated) DEVICE — Device

## (undated) DEVICE — PACK MINOR

## (undated) DEVICE — GOWN XL

## (undated) DEVICE — PREP CHLORAPREP HI-LITE ORANGE 26ML

## (undated) DEVICE — STAPLER COVIDIEN ENDO GIA XL HANDLE

## (undated) DEVICE — LIGASURE MARYLAND 5MM X 37CM